# Patient Record
Sex: MALE | Race: WHITE | URBAN - METROPOLITAN AREA
[De-identification: names, ages, dates, MRNs, and addresses within clinical notes are randomized per-mention and may not be internally consistent; named-entity substitution may affect disease eponyms.]

---

## 2022-01-11 PROBLEM — F51.01 PRIMARY INSOMNIA: Status: ACTIVE | Noted: 2022-01-11

## 2022-01-11 PROBLEM — E03.9 HYPOTHYROIDISM: Status: ACTIVE | Noted: 2022-01-11

## 2023-02-13 ENCOUNTER — APPOINTMENT (OUTPATIENT)
Dept: RADIOLOGY | Facility: MEDICAL CENTER | Age: 63
DRG: 481 | End: 2023-02-13
Payer: OTHER MISCELLANEOUS

## 2023-02-13 ENCOUNTER — HOSPITAL ENCOUNTER (OUTPATIENT)
Dept: RADIOLOGY | Facility: MEDICAL CENTER | Age: 63
End: 2023-02-13

## 2023-02-13 ENCOUNTER — HOSPITAL ENCOUNTER (INPATIENT)
Facility: MEDICAL CENTER | Age: 63
LOS: 3 days | DRG: 481 | End: 2023-02-16
Attending: EMERGENCY MEDICINE | Admitting: INTERNAL MEDICINE
Payer: OTHER MISCELLANEOUS

## 2023-02-13 DIAGNOSIS — S72.432A CLOSED BICONDYLAR FRACTURE OF LEFT FEMUR, INITIAL ENCOUNTER (HCC): ICD-10-CM

## 2023-02-13 DIAGNOSIS — S72.422A CLOSED BICONDYLAR FRACTURE OF LEFT FEMUR, INITIAL ENCOUNTER (HCC): ICD-10-CM
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
## 2023-02-13 DIAGNOSIS — S72.22XA CLOSED LEFT SUBTROCHANTERIC FEMUR FRACTURE, INITIAL ENCOUNTER (HCC): ICD-10-CM

## 2023-02-13 DIAGNOSIS — G89.18 POST-OPERATIVE PAIN: ICD-10-CM

## 2023-02-13 PROBLEM — D69.6 THROMBOCYTOPENIA (HCC): Status: ACTIVE | Noted: 2023-02-13

## 2023-02-13 PROCEDURE — 770001 HCHG ROOM/CARE - MED/SURG/GYN PRIV*

## 2023-02-13 PROCEDURE — 99223 1ST HOSP IP/OBS HIGH 75: CPT | Performed by: INTERNAL MEDICINE

## 2023-02-13 PROCEDURE — A9270 NON-COVERED ITEM OR SERVICE: HCPCS | Performed by: INTERNAL MEDICINE

## 2023-02-13 PROCEDURE — 36415 COLL VENOUS BLD VENIPUNCTURE: CPT

## 2023-02-13 PROCEDURE — 305948 HCHG GREEN TRAUMA ACT PRE-NOTIFY NO CC

## 2023-02-13 PROCEDURE — 99285 EMERGENCY DEPT VISIT HI MDM: CPT

## 2023-02-13 PROCEDURE — 73552 X-RAY EXAM OF FEMUR 2/>: CPT | Mod: LT

## 2023-02-13 PROCEDURE — 700102 HCHG RX REV CODE 250 W/ 637 OVERRIDE(OP): Performed by: INTERNAL MEDICINE

## 2023-02-13 RX ORDER — DOCUSATE SODIUM 100 MG/1
300 CAPSULE, LIQUID FILLED ORAL
COMMUNITY

## 2023-02-13 RX ORDER — OXYCODONE HYDROCHLORIDE 5 MG/1
5 TABLET ORAL
Status: DISCONTINUED | OUTPATIENT
Start: 2023-02-13 | End: 2023-02-16 | Stop reason: HOSPADM

## 2023-02-13 RX ORDER — AMOXICILLIN 250 MG
2 CAPSULE ORAL 2 TIMES DAILY
Status: DISCONTINUED | OUTPATIENT
Start: 2023-02-13 | End: 2023-02-14

## 2023-02-13 RX ORDER — ONDANSETRON 4 MG/1
4 TABLET, ORALLY DISINTEGRATING ORAL EVERY 4 HOURS PRN
Status: DISCONTINUED | OUTPATIENT
Start: 2023-02-13 | End: 2023-02-16 | Stop reason: HOSPADM

## 2023-02-13 RX ORDER — ACETAMINOPHEN 325 MG/1
650 TABLET ORAL EVERY 6 HOURS PRN
Status: DISCONTINUED | OUTPATIENT
Start: 2023-02-13 | End: 2023-02-16 | Stop reason: HOSPADM

## 2023-02-13 RX ORDER — HYDROMORPHONE HYDROCHLORIDE 1 MG/ML
0.25 INJECTION, SOLUTION INTRAMUSCULAR; INTRAVENOUS; SUBCUTANEOUS
Status: DISCONTINUED | OUTPATIENT
Start: 2023-02-13 | End: 2023-02-16 | Stop reason: HOSPADM

## 2023-02-13 RX ORDER — BISACODYL 10 MG
10 SUPPOSITORY, RECTAL RECTAL
Status: DISCONTINUED | OUTPATIENT
Start: 2023-02-13 | End: 2023-02-16 | Stop reason: HOSPADM

## 2023-02-13 RX ORDER — PROMETHAZINE HYDROCHLORIDE 25 MG/1
12.5-25 TABLET ORAL EVERY 4 HOURS PRN
Status: DISCONTINUED | OUTPATIENT
Start: 2023-02-13 | End: 2023-02-16 | Stop reason: HOSPADM

## 2023-02-13 RX ORDER — ONDANSETRON 2 MG/ML
4 INJECTION INTRAMUSCULAR; INTRAVENOUS EVERY 4 HOURS PRN
Status: DISCONTINUED | OUTPATIENT
Start: 2023-02-13 | End: 2023-02-16 | Stop reason: HOSPADM

## 2023-02-13 RX ORDER — OXYCODONE HYDROCHLORIDE 5 MG/1
2.5 TABLET ORAL
Status: DISCONTINUED | OUTPATIENT
Start: 2023-02-13 | End: 2023-02-16 | Stop reason: HOSPADM

## 2023-02-13 RX ORDER — PROCHLORPERAZINE EDISYLATE 5 MG/ML
5-10 INJECTION INTRAMUSCULAR; INTRAVENOUS EVERY 4 HOURS PRN
Status: DISCONTINUED | OUTPATIENT
Start: 2023-02-13 | End: 2023-02-16 | Stop reason: HOSPADM

## 2023-02-13 RX ORDER — PROMETHAZINE HYDROCHLORIDE 25 MG/1
12.5-25 SUPPOSITORY RECTAL EVERY 4 HOURS PRN
Status: DISCONTINUED | OUTPATIENT
Start: 2023-02-13 | End: 2023-02-16 | Stop reason: HOSPADM

## 2023-02-13 RX ORDER — THYROID 120 MG/1
240 TABLET ORAL DAILY
Status: DISCONTINUED | OUTPATIENT
Start: 2023-02-14 | End: 2023-02-16 | Stop reason: HOSPADM

## 2023-02-13 RX ORDER — POLYETHYLENE GLYCOL 3350 17 G/17G
1 POWDER, FOR SOLUTION ORAL
Status: DISCONTINUED | OUTPATIENT
Start: 2023-02-13 | End: 2023-02-16 | Stop reason: HOSPADM

## 2023-02-13 RX ORDER — GABAPENTIN 300 MG/1
600 CAPSULE ORAL
Status: DISCONTINUED | OUTPATIENT
Start: 2023-02-13 | End: 2023-02-16 | Stop reason: HOSPADM

## 2023-02-13 RX ADMIN — ACETAMINOPHEN 650 MG: 325 TABLET, FILM COATED ORAL at 23:45

## 2023-02-13 ASSESSMENT — ENCOUNTER SYMPTOMS
NAUSEA: 0
COUGH: 0
ABDOMINAL PAIN: 0
FEVER: 0
NERVOUS/ANXIOUS: 0
SHORTNESS OF BREATH: 0
LOSS OF CONSCIOUSNESS: 0
HEADACHES: 0
SORE THROAT: 0
DOUBLE VISION: 0
DIARRHEA: 0
VOMITING: 0
FALLS: 1
SEIZURES: 0
PALPITATIONS: 0
HALLUCINATIONS: 0
CHILLS: 0
BLURRED VISION: 0
CONSTIPATION: 0

## 2023-02-13 ASSESSMENT — COGNITIVE AND FUNCTIONAL STATUS - GENERAL
MOVING FROM LYING ON BACK TO SITTING ON SIDE OF FLAT BED: A LITTLE
SUGGESTED CMS G CODE MODIFIER DAILY ACTIVITY: CJ
MOBILITY SCORE: 16
CLIMB 3 TO 5 STEPS WITH RAILING: A LOT
STANDING UP FROM CHAIR USING ARMS: A LOT
SUGGESTED CMS G CODE MODIFIER MOBILITY: CK
WALKING IN HOSPITAL ROOM: TOTAL
DAILY ACTIVITIY SCORE: 22
DRESSING REGULAR LOWER BODY CLOTHING: A LITTLE
HELP NEEDED FOR BATHING: A LITTLE

## 2023-02-13 ASSESSMENT — LIFESTYLE VARIABLES
TOTAL SCORE: 0
HAVE PEOPLE ANNOYED YOU BY CRITICIZING YOUR DRINKING: NO
HOW MANY TIMES IN THE PAST YEAR HAVE YOU HAD 5 OR MORE DRINKS IN A DAY: 0
HAVE YOU EVER FELT YOU SHOULD CUT DOWN ON YOUR DRINKING: NO
ON A TYPICAL DAY WHEN YOU DRINK ALCOHOL HOW MANY DRINKS DO YOU HAVE: 4
AVERAGE NUMBER OF DAYS PER WEEK YOU HAVE A DRINK CONTAINING ALCOHOL: 7
ALCOHOL_USE: YES
EVER FELT BAD OR GUILTY ABOUT YOUR DRINKING: NO
TOTAL SCORE: 0
EVER HAD A DRINK FIRST THING IN THE MORNING TO STEADY YOUR NERVES TO GET RID OF A HANGOVER: NO
TOTAL SCORE: 0
CONSUMPTION TOTAL: POSITIVE

## 2023-02-13 ASSESSMENT — PATIENT HEALTH QUESTIONNAIRE - PHQ9
1. LITTLE INTEREST OR PLEASURE IN DOING THINGS: NOT AT ALL
SUM OF ALL RESPONSES TO PHQ9 QUESTIONS 1 AND 2: 0
2. FEELING DOWN, DEPRESSED, IRRITABLE, OR HOPELESS: NOT AT ALL

## 2023-02-13 ASSESSMENT — FIBROSIS 4 INDEX
FIB4 SCORE: 1.961161351381840319
FIB4 SCORE: 1.961161351381840319

## 2023-02-13 ASSESSMENT — PAIN DESCRIPTION - PAIN TYPE: TYPE: ACUTE PAIN

## 2023-02-14 ENCOUNTER — APPOINTMENT (OUTPATIENT)
Dept: RADIOLOGY | Facility: MEDICAL CENTER | Age: 63
DRG: 481 | End: 2023-02-14
Attending: STUDENT IN AN ORGANIZED HEALTH CARE EDUCATION/TRAINING PROGRAM
Payer: OTHER MISCELLANEOUS

## 2023-02-14 ENCOUNTER — ANESTHESIA (OUTPATIENT)
Dept: SURGERY | Facility: MEDICAL CENTER | Age: 63
DRG: 481 | End: 2023-02-14
Payer: OTHER MISCELLANEOUS

## 2023-02-14 LAB
ABO + RH BLD: NORMAL
ABO GROUP BLD: NORMAL
ALBUMIN SERPL BCP-MCNC: 3.8 G/DL (ref 3.2–4.9)
BASOPHILS # BLD AUTO: 0.1 % (ref 0–1.8)
BASOPHILS # BLD: 0.01 K/UL (ref 0–0.12)
BLD GP AB SCN SERPL QL: NORMAL
BUN SERPL-MCNC: 18 MG/DL (ref 8–22)
CALCIUM ALBUM COR SERPL-MCNC: 8.6 MG/DL (ref 8.5–10.5)
CALCIUM SERPL-MCNC: 8.4 MG/DL (ref 8.5–10.5)
CHLORIDE SERPL-SCNC: 104 MMOL/L (ref 96–112)
CO2 SERPL-SCNC: 23 MMOL/L (ref 20–33)
CREAT SERPL-MCNC: 0.66 MG/DL (ref 0.5–1.4)
EKG IMPRESSION: NORMAL
EOSINOPHIL # BLD AUTO: 0.01 K/UL (ref 0–0.51)
EOSINOPHIL NFR BLD: 0.1 % (ref 0–6.9)
ERYTHROCYTE [DISTWIDTH] IN BLOOD BY AUTOMATED COUNT: 41.4 FL (ref 35.9–50)
GFR SERPLBLD CREATININE-BSD FMLA CKD-EPI: 104 ML/MIN/1.73 M 2
GLUCOSE SERPL-MCNC: 153 MG/DL (ref 65–99)
HCT VFR BLD AUTO: 37.6 % (ref 42–52)
HGB BLD-MCNC: 13.3 G/DL (ref 14–18)
IMM GRANULOCYTES # BLD AUTO: 0.05 K/UL (ref 0–0.11)
IMM GRANULOCYTES NFR BLD AUTO: 0.5 % (ref 0–0.9)
LYMPHOCYTES # BLD AUTO: 0.81 K/UL (ref 1–4.8)
LYMPHOCYTES NFR BLD: 8.8 % (ref 22–41)
MAGNESIUM SERPL-MCNC: 1.8 MG/DL (ref 1.5–2.5)
MCH RBC QN AUTO: 31.1 PG (ref 27–33)
MCHC RBC AUTO-ENTMCNC: 35.4 G/DL (ref 33.7–35.3)
MCV RBC AUTO: 87.9 FL (ref 81.4–97.8)
MONOCYTES # BLD AUTO: 1.03 K/UL (ref 0–0.85)
MONOCYTES NFR BLD AUTO: 11.2 % (ref 0–13.4)
NEUTROPHILS # BLD AUTO: 7.25 K/UL (ref 1.82–7.42)
NEUTROPHILS NFR BLD: 79.3 % (ref 44–72)
NRBC # BLD AUTO: 0 K/UL
NRBC BLD-RTO: 0 /100 WBC
PHOSPHATE SERPL-MCNC: 3.9 MG/DL (ref 2.5–4.5)
PLATELET # BLD AUTO: 137 K/UL (ref 164–446)
PMV BLD AUTO: 11.1 FL (ref 9–12.9)
POTASSIUM SERPL-SCNC: 3.9 MMOL/L (ref 3.6–5.5)
RBC # BLD AUTO: 4.28 M/UL (ref 4.7–6.1)
RH BLD: NORMAL
SODIUM SERPL-SCNC: 137 MMOL/L (ref 135–145)
WBC # BLD AUTO: 9.2 K/UL (ref 4.8–10.8)

## 2023-02-14 PROCEDURE — C1713 ANCHOR/SCREW BN/BN,TIS/BN: HCPCS | Performed by: STUDENT IN AN ORGANIZED HEALTH CARE EDUCATION/TRAINING PROGRAM

## 2023-02-14 PROCEDURE — 85025 COMPLETE CBC W/AUTO DIFF WBC: CPT

## 2023-02-14 PROCEDURE — 700111 HCHG RX REV CODE 636 W/ 250 OVERRIDE (IP): Performed by: ANESTHESIOLOGY

## 2023-02-14 PROCEDURE — 01230 ANES OPN UPPER 2/3 FEMUR NOS: CPT | Performed by: ANESTHESIOLOGY

## 2023-02-14 PROCEDURE — 86850 RBC ANTIBODY SCREEN: CPT

## 2023-02-14 PROCEDURE — 93005 ELECTROCARDIOGRAM TRACING: CPT | Performed by: STUDENT IN AN ORGANIZED HEALTH CARE EDUCATION/TRAINING PROGRAM

## 2023-02-14 PROCEDURE — 700102 HCHG RX REV CODE 250 W/ 637 OVERRIDE(OP): Performed by: NURSE PRACTITIONER

## 2023-02-14 PROCEDURE — 99233 SBSQ HOSP IP/OBS HIGH 50: CPT | Performed by: STUDENT IN AN ORGANIZED HEALTH CARE EDUCATION/TRAINING PROGRAM

## 2023-02-14 PROCEDURE — 502240 HCHG MISC OR SUPPLY RC 0272: Performed by: STUDENT IN AN ORGANIZED HEALTH CARE EDUCATION/TRAINING PROGRAM

## 2023-02-14 PROCEDURE — 160002 HCHG RECOVERY MINUTES (STAT): Performed by: STUDENT IN AN ORGANIZED HEALTH CARE EDUCATION/TRAINING PROGRAM

## 2023-02-14 PROCEDURE — 770001 HCHG ROOM/CARE - MED/SURG/GYN PRIV*

## 2023-02-14 PROCEDURE — 86900 BLOOD TYPING SEROLOGIC ABO: CPT

## 2023-02-14 PROCEDURE — 93010 ELECTROCARDIOGRAM REPORT: CPT | Performed by: INTERNAL MEDICINE

## 2023-02-14 PROCEDURE — 700102 HCHG RX REV CODE 250 W/ 637 OVERRIDE(OP): Performed by: INTERNAL MEDICINE

## 2023-02-14 PROCEDURE — 160036 HCHG PACU - EA ADDL 30 MINS PHASE I: Performed by: STUDENT IN AN ORGANIZED HEALTH CARE EDUCATION/TRAINING PROGRAM

## 2023-02-14 PROCEDURE — 160029 HCHG SURGERY MINUTES - 1ST 30 MINS LEVEL 4: Performed by: STUDENT IN AN ORGANIZED HEALTH CARE EDUCATION/TRAINING PROGRAM

## 2023-02-14 PROCEDURE — 700105 HCHG RX REV CODE 258: Performed by: ANESTHESIOLOGY

## 2023-02-14 PROCEDURE — 700101 HCHG RX REV CODE 250: Performed by: ANESTHESIOLOGY

## 2023-02-14 PROCEDURE — 80069 RENAL FUNCTION PANEL: CPT

## 2023-02-14 PROCEDURE — 160041 HCHG SURGERY MINUTES - EA ADDL 1 MIN LEVEL 4: Performed by: STUDENT IN AN ORGANIZED HEALTH CARE EDUCATION/TRAINING PROGRAM

## 2023-02-14 PROCEDURE — 27513 TREATMENT OF THIGH FRACTURE: CPT | Mod: LT | Performed by: STUDENT IN AN ORGANIZED HEALTH CARE EDUCATION/TRAINING PROGRAM

## 2023-02-14 PROCEDURE — 700105 HCHG RX REV CODE 258: Performed by: NURSE PRACTITIONER

## 2023-02-14 PROCEDURE — A9270 NON-COVERED ITEM OR SERVICE: HCPCS | Performed by: NURSE PRACTITIONER

## 2023-02-14 PROCEDURE — A9270 NON-COVERED ITEM OR SERVICE: HCPCS | Performed by: ANESTHESIOLOGY

## 2023-02-14 PROCEDURE — 110371 HCHG SHELL REV 272: Performed by: STUDENT IN AN ORGANIZED HEALTH CARE EDUCATION/TRAINING PROGRAM

## 2023-02-14 PROCEDURE — 99222 1ST HOSP IP/OBS MODERATE 55: CPT | Mod: 57 | Performed by: STUDENT IN AN ORGANIZED HEALTH CARE EDUCATION/TRAINING PROGRAM

## 2023-02-14 PROCEDURE — 160048 HCHG OR STATISTICAL LEVEL 1-5: Performed by: STUDENT IN AN ORGANIZED HEALTH CARE EDUCATION/TRAINING PROGRAM

## 2023-02-14 PROCEDURE — 83735 ASSAY OF MAGNESIUM: CPT

## 2023-02-14 PROCEDURE — 0QSC06Z REPOSITION LEFT LOWER FEMUR WITH INTRAMEDULLARY INTERNAL FIXATION DEVICE, OPEN APPROACH: ICD-10-PCS | Performed by: STUDENT IN AN ORGANIZED HEALTH CARE EDUCATION/TRAINING PROGRAM

## 2023-02-14 PROCEDURE — 502000 HCHG MISC OR IMPLANTS RC 0278: Performed by: STUDENT IN AN ORGANIZED HEALTH CARE EDUCATION/TRAINING PROGRAM

## 2023-02-14 PROCEDURE — 73552 X-RAY EXAM OF FEMUR 2/>: CPT | Mod: LT

## 2023-02-14 PROCEDURE — 27513 TREATMENT OF THIGH FRACTURE: CPT | Mod: 80ROC,LT | Performed by: STUDENT IN AN ORGANIZED HEALTH CARE EDUCATION/TRAINING PROGRAM

## 2023-02-14 PROCEDURE — A9270 NON-COVERED ITEM OR SERVICE: HCPCS | Performed by: INTERNAL MEDICINE

## 2023-02-14 PROCEDURE — 700102 HCHG RX REV CODE 250 W/ 637 OVERRIDE(OP): Performed by: ANESTHESIOLOGY

## 2023-02-14 PROCEDURE — 160035 HCHG PACU - 1ST 60 MINS PHASE I: Performed by: STUDENT IN AN ORGANIZED HEALTH CARE EDUCATION/TRAINING PROGRAM

## 2023-02-14 PROCEDURE — 160009 HCHG ANES TIME/MIN: Performed by: STUDENT IN AN ORGANIZED HEALTH CARE EDUCATION/TRAINING PROGRAM

## 2023-02-14 PROCEDURE — 36415 COLL VENOUS BLD VENIPUNCTURE: CPT

## 2023-02-14 PROCEDURE — 86901 BLOOD TYPING SEROLOGIC RH(D): CPT

## 2023-02-14 DEVICE — IMPLANTABLE DEVICE: Type: IMPLANTABLE DEVICE | Site: LEG | Status: FUNCTIONAL

## 2023-02-14 DEVICE — LOCKING SCREW DIA 5X85MM: Type: IMPLANTABLE DEVICE | Site: LEG | Status: FUNCTIONAL

## 2023-02-14 DEVICE — LOCKING SCREW DIA 5X37.5MM: Type: IMPLANTABLE DEVICE | Site: LEG | Status: FUNCTIONAL

## 2023-02-14 DEVICE — LOCKING SCREW DIA 5X75MM: Type: IMPLANTABLE DEVICE | Site: LEG | Status: FUNCTIONAL

## 2023-02-14 RX ORDER — SODIUM CHLORIDE, SODIUM LACTATE, POTASSIUM CHLORIDE, AND CALCIUM CHLORIDE .6; .31; .03; .02 G/100ML; G/100ML; G/100ML; G/100ML
1000 INJECTION, SOLUTION INTRAVENOUS ONCE
Status: COMPLETED | OUTPATIENT
Start: 2023-02-14 | End: 2023-02-14

## 2023-02-14 RX ORDER — ONDANSETRON 2 MG/ML
4 INJECTION INTRAMUSCULAR; INTRAVENOUS
Status: COMPLETED | OUTPATIENT
Start: 2023-02-14 | End: 2023-02-14

## 2023-02-14 RX ORDER — OXYCODONE HCL 5 MG/5 ML
10 SOLUTION, ORAL ORAL
Status: COMPLETED | OUTPATIENT
Start: 2023-02-14 | End: 2023-02-14

## 2023-02-14 RX ORDER — METOCLOPRAMIDE HYDROCHLORIDE 5 MG/ML
INJECTION INTRAMUSCULAR; INTRAVENOUS PRN
Status: DISCONTINUED | OUTPATIENT
Start: 2023-02-14 | End: 2023-02-14 | Stop reason: SURG

## 2023-02-14 RX ORDER — DIPHENHYDRAMINE HYDROCHLORIDE 50 MG/ML
12.5 INJECTION INTRAMUSCULAR; INTRAVENOUS
Status: DISCONTINUED | OUTPATIENT
Start: 2023-02-14 | End: 2023-02-14 | Stop reason: HOSPADM

## 2023-02-14 RX ORDER — MEPERIDINE HYDROCHLORIDE 25 MG/ML
12.5 INJECTION INTRAMUSCULAR; INTRAVENOUS; SUBCUTANEOUS
Status: DISCONTINUED | OUTPATIENT
Start: 2023-02-14 | End: 2023-02-14 | Stop reason: HOSPADM

## 2023-02-14 RX ORDER — CEFAZOLIN SODIUM 1 G/3ML
INJECTION, POWDER, FOR SOLUTION INTRAMUSCULAR; INTRAVENOUS PRN
Status: DISCONTINUED | OUTPATIENT
Start: 2023-02-14 | End: 2023-02-14 | Stop reason: SURG

## 2023-02-14 RX ORDER — DOCUSATE SODIUM 100 MG/1
100 CAPSULE, LIQUID FILLED ORAL NIGHTLY
Status: DISCONTINUED | OUTPATIENT
Start: 2023-02-14 | End: 2023-02-14

## 2023-02-14 RX ORDER — SODIUM CHLORIDE, SODIUM LACTATE, POTASSIUM CHLORIDE, CALCIUM CHLORIDE 600; 310; 30; 20 MG/100ML; MG/100ML; MG/100ML; MG/100ML
INJECTION, SOLUTION INTRAVENOUS
Status: DISCONTINUED | OUTPATIENT
Start: 2023-02-14 | End: 2023-02-14 | Stop reason: SURG

## 2023-02-14 RX ORDER — HALOPERIDOL 5 MG/ML
1 INJECTION INTRAMUSCULAR
Status: DISCONTINUED | OUTPATIENT
Start: 2023-02-14 | End: 2023-02-14 | Stop reason: HOSPADM

## 2023-02-14 RX ORDER — HYDROMORPHONE HYDROCHLORIDE 1 MG/ML
0.2 INJECTION, SOLUTION INTRAMUSCULAR; INTRAVENOUS; SUBCUTANEOUS
Status: DISCONTINUED | OUTPATIENT
Start: 2023-02-14 | End: 2023-02-14 | Stop reason: HOSPADM

## 2023-02-14 RX ORDER — HYDROMORPHONE HYDROCHLORIDE 1 MG/ML
0.1 INJECTION, SOLUTION INTRAMUSCULAR; INTRAVENOUS; SUBCUTANEOUS
Status: DISCONTINUED | OUTPATIENT
Start: 2023-02-14 | End: 2023-02-14 | Stop reason: HOSPADM

## 2023-02-14 RX ORDER — DEXAMETHASONE SODIUM PHOSPHATE 4 MG/ML
INJECTION, SOLUTION INTRA-ARTICULAR; INTRALESIONAL; INTRAMUSCULAR; INTRAVENOUS; SOFT TISSUE PRN
Status: DISCONTINUED | OUTPATIENT
Start: 2023-02-14 | End: 2023-02-14 | Stop reason: SURG

## 2023-02-14 RX ORDER — OXYCODONE HCL 5 MG/5 ML
5 SOLUTION, ORAL ORAL
Status: COMPLETED | OUTPATIENT
Start: 2023-02-14 | End: 2023-02-14

## 2023-02-14 RX ORDER — HYDROMORPHONE HYDROCHLORIDE 1 MG/ML
0.4 INJECTION, SOLUTION INTRAMUSCULAR; INTRAVENOUS; SUBCUTANEOUS
Status: DISCONTINUED | OUTPATIENT
Start: 2023-02-14 | End: 2023-02-14 | Stop reason: HOSPADM

## 2023-02-14 RX ORDER — HYDROMORPHONE HYDROCHLORIDE 2 MG/ML
INJECTION, SOLUTION INTRAMUSCULAR; INTRAVENOUS; SUBCUTANEOUS PRN
Status: DISCONTINUED | OUTPATIENT
Start: 2023-02-14 | End: 2023-02-14 | Stop reason: SURG

## 2023-02-14 RX ORDER — DOCUSATE SODIUM 100 MG/1
300 CAPSULE, LIQUID FILLED ORAL NIGHTLY
Status: DISCONTINUED | OUTPATIENT
Start: 2023-02-15 | End: 2023-02-14

## 2023-02-14 RX ORDER — DOCUSATE SODIUM 100 MG/1
300 CAPSULE, LIQUID FILLED ORAL NIGHTLY
Status: DISCONTINUED | OUTPATIENT
Start: 2023-02-14 | End: 2023-02-16 | Stop reason: HOSPADM

## 2023-02-14 RX ADMIN — DOCUSATE SODIUM 300 MG: 100 CAPSULE, LIQUID FILLED ORAL at 20:20

## 2023-02-14 RX ADMIN — OXYCODONE HYDROCHLORIDE 5 MG: 5 SOLUTION ORAL at 14:36

## 2023-02-14 RX ADMIN — GABAPENTIN 600 MG: 300 CAPSULE ORAL at 00:00

## 2023-02-14 RX ADMIN — SODIUM CHLORIDE, POTASSIUM CHLORIDE, SODIUM LACTATE AND CALCIUM CHLORIDE: 600; 310; 30; 20 INJECTION, SOLUTION INTRAVENOUS at 11:49

## 2023-02-14 RX ADMIN — MEPERIDINE HYDROCHLORIDE 12.5 MG: 25 INJECTION INTRAMUSCULAR; INTRAVENOUS; SUBCUTANEOUS at 13:58

## 2023-02-14 RX ADMIN — HYDROMORPHONE HYDROCHLORIDE 2 MG: 2 INJECTION INTRAMUSCULAR; INTRAVENOUS; SUBCUTANEOUS at 12:53

## 2023-02-14 RX ADMIN — THYROID, PORCINE 240 MG: 120 TABLET ORAL at 05:13

## 2023-02-14 RX ADMIN — FENTANYL CITRATE 100 MCG: 50 INJECTION, SOLUTION INTRAMUSCULAR; INTRAVENOUS at 12:17

## 2023-02-14 RX ADMIN — ROCURONIUM BROMIDE 50 MG: 10 INJECTION, SOLUTION INTRAVENOUS at 11:56

## 2023-02-14 RX ADMIN — SODIUM CHLORIDE, POTASSIUM CHLORIDE, SODIUM LACTATE AND CALCIUM CHLORIDE 1000 ML: 600; 310; 30; 20 INJECTION, SOLUTION INTRAVENOUS at 20:20

## 2023-02-14 RX ADMIN — PROPOFOL 150 MG: 10 INJECTION, EMULSION INTRAVENOUS at 11:56

## 2023-02-14 RX ADMIN — OXYCODONE 5 MG: 5 TABLET ORAL at 00:01

## 2023-02-14 RX ADMIN — OXYCODONE 5 MG: 5 TABLET ORAL at 10:19

## 2023-02-14 RX ADMIN — ONDANSETRON 4 MG: 2 INJECTION INTRAMUSCULAR; INTRAVENOUS at 15:15

## 2023-02-14 RX ADMIN — SENNOSIDES AND DOCUSATE SODIUM 2 TABLET: 50; 8.6 TABLET ORAL at 17:57

## 2023-02-14 RX ADMIN — DEXAMETHASONE SODIUM PHOSPHATE 4 MG: 4 INJECTION, SOLUTION INTRA-ARTICULAR; INTRALESIONAL; INTRAMUSCULAR; INTRAVENOUS; SOFT TISSUE at 11:56

## 2023-02-14 RX ADMIN — GABAPENTIN 600 MG: 300 CAPSULE ORAL at 20:20

## 2023-02-14 RX ADMIN — OXYCODONE 5 MG: 5 TABLET ORAL at 21:57

## 2023-02-14 RX ADMIN — METOCLOPRAMIDE 10 MG: 5 INJECTION, SOLUTION INTRAMUSCULAR; INTRAVENOUS at 11:56

## 2023-02-14 RX ADMIN — CEFAZOLIN 2 G: 330 INJECTION, POWDER, FOR SOLUTION INTRAMUSCULAR; INTRAVENOUS at 11:58

## 2023-02-14 ASSESSMENT — ENCOUNTER SYMPTOMS
MYALGIAS: 0
SHORTNESS OF BREATH: 0
NERVOUS/ANXIOUS: 0
SENSORY CHANGE: 0
INSOMNIA: 1
BLURRED VISION: 0
SPEECH CHANGE: 0
FEVER: 0
NAUSEA: 0
FOCAL WEAKNESS: 0
VOMITING: 0
ABDOMINAL PAIN: 0

## 2023-02-14 ASSESSMENT — LIFESTYLE VARIABLES: SUBSTANCE_ABUSE: 0

## 2023-02-14 ASSESSMENT — PAIN DESCRIPTION - PAIN TYPE: TYPE: ACUTE PAIN;SURGICAL PAIN

## 2023-02-14 ASSESSMENT — PAIN SCALES - GENERAL: PAIN_LEVEL: 3

## 2023-02-14 NOTE — ANESTHESIA POSTPROCEDURE EVALUATION
Patient: Christian Chilel    Procedure Summary     Date: 02/14/23 Room / Location: [place] [id number] / SURGERY [facility]    Anesthesia Start: 1149 Anesthesia Stop: 1355    Procedure: INSERTION, INTRAMEDULLARY VEDA, FEMUR RETROGRADE (Left: Leg Upper) Diagnosis: ( left comminuted distal femur fracture )    Surgeons: Rick Gamino M.D. Responsible Provider: Rufino Marshall M.D.    Anesthesia Type: general ASA Status: 3          Final Anesthesia Type: general  Last vitals  BP   Blood Pressure: 121/76    Temp   37.4 °C (99.4 °F)    Pulse   99   Resp   16    SpO2   100 %      Anesthesia Post Evaluation    Patient location during evaluation: PACU  Patient participation: complete - patient participated  Level of consciousness: awake and alert  Pain score: 3    Airway patency: patent  Anesthetic complications: no  Cardiovascular status: hemodynamically stable  Respiratory status: acceptable  Hydration status: euvolemic    PONV: none          No notable events documented.     Nurse Pain Score: 2 (NPRS)

## 2023-02-14 NOTE — OP REPORT
DATE OF OPERATION: 2/14/2023     PREOPERATIVE DIAGNOSIS: Left distal femur intra-articular fracture    POSTOPERATIVE DIAGNOSIS: Same    PROCEDURE PERFORMED: Left femur open reduction internal fixation with retrograde intramedullary nail    SURGEON: Rick Gamino M.D.     ASSISTANT: Randy Cameron MD, fellow    ANESTHESIA: General    SPECIMEN: None    ESTIMATED BLOOD LOSS: 30 mL    IMPLANTS: [place] retrograde nail 13 x 380 mm, [place] mini frag plate 2.7 mm, [place] 5.0 mm headless compression screws x2      INDICATIONS: The patient is a 62 y.o. male who presented with above.  I discussed the risks and benefits of the procedure which include but are not limited to risks of infection, wound healing complication, neurovascular injury, pain, malunion, non-union, malrotation, and the medical risks of anesthesia including MI, stroke, and death.  Alternatives to surgery were also discussed, including non-operative management, which I did not recommend.  The patient was in agreement with the plan to proceed, and the informed consent was signed and documented.  I met with the patient pre-operatively and marked the operative extremity with their agreement.  We proceeded to the operating room.     DESCRIPTION OF PROCEDURE:  Patient was seen in the preoperative holding area on the day of surgery. The operative site was marked with my initials.  he was taken to the operating room and placed supine on the operative table.  Anesthesia was induced.  The operative extremity was prepped and draped in the normal sterile fashion.  Operative pause was conducted and the correct patient, site, side, procedure, and surgeon's initials on extremity were identified.  A extended lateral parapatellar approach was performed at the knee joint.  There was obvious intra-articular split.  This was cleaned of any hematoma and reduced anatomically.  This was held provisionally with pointed reduction clamp.  The guidewires for the 5.0 mm  headless compression screws were then placed on AP and lateral views.  Keeping in mind to interfere with our eventual nail trajectory.  Next the lateral fracture plane was reduced reducing articular block to the shaft.  This was held provisionally with a 2.7 mm small frag plate with unicortical locking screws.  This aligned our length alignment rotation.  There is a medial butterfly fragment that was reduced with percutaneous pointed reduction clamp.  We then placed our guidewire for the intramedullary nail.  Anterior was then utilized again intracanal.  We underwent sequential reaming up to 14 mm reamer.  We then placed the appropriate length 30 mm nail.  Once this was melena placed we placed multiple distal interlock screws through the jig.  We then placed a single proximal interlock screws using perfect [place] technique.  Final images were obtained taking appropriate reduction of length alignment rotation.  The wound was thoroughly irrigated sterile saline closed in layered fashion.  Sterile dressings were applied.  Patient was woken taken to PACU stable condition.    POSTOPERATIVE PLAN: Toe-touch weightbearing left lower extremity, knee range of motion as tolerated.  Weight bearing.  Mobilize with physical and occupational therapies.  DVT prophylaxis with SCDs and Lovenox until mobilizing independently and then can be switched to aspirin for 4 weeks.  The patient will follow up in clinic in 2 weeks to check wounds and remove sutures/staples.      ____________________________________   Rick Gamino M.D.   DD: 2/14/2023  1:31 PM

## 2023-02-14 NOTE — THERAPY
Patient Name: Christian Chilel  Age:  62 y.o., Sex:  male  Medical Record #: [id number]  Today's Date: 2/14/2023     Pt to OR today for Femur fixation, will hold and follow up for PT evaluation post-operatively.    DENI FregosoT

## 2023-02-14 NOTE — H&P
Hospital Medicine History & Physical Note    Date of Service  2/13/2023    Primary Care Physician  Les Zamorano P.A.-C.    Consultants  orthopedics    Code Status  Full Code    Chief Complaint  Chief Complaint   Patient presents with    Trauma Green     Pt arrives as a trauma green transfer from [facility] [place]. Pt sustained a GLF today and fractured his left femur. Left leg currently in a knee immobilizer post reduction. GCS 15. -loc, - thinners        History of Presenting Illness  Christian Chilel is a 62 y.o. male who presented 2/13/2023 with fall.  History is significant for hypothyroidism, insomnia.  Patient presents today after sustaining a mechanical, ground-level fall.  Denies loss of dizziness or trauma to head.  She denies nausea, vomiting, fever, chills, chest pain, shortness of breath.  Presentation to the ED imaging of the left leg demonstrated a left femoral fracture.  Laboratory evaluation was largely unremarkable.  [facility] surgery was consulted and plan for OR in the morning.  Discussed case with ER provider and patient will be admitted for further work-up and monitoring.    I discussed the plan of care with patient and ER provider .    Review of Systems  Review of Systems   Constitutional:  Negative for chills and fever.   HENT:  Negative for congestion and sore throat.    Eyes:  Negative for blurred vision and double vision.   Respiratory:  Negative for cough and shortness of breath.    Cardiovascular:  Negative for chest pain and palpitations.   Gastrointestinal:  Negative for abdominal pain, constipation, diarrhea, nausea and vomiting.   Genitourinary:  Negative for dysuria and frequency.   Musculoskeletal:  Positive for falls and joint pain.   Skin:  Negative for rash.   Neurological:  Negative for seizures, loss of consciousness and headaches.   Psychiatric/Behavioral:  Negative for hallucinations. The patient is not nervous/anxious.      Past Medical History   has a past medical history of History of  chickenpox and Insomnia.    Surgical History   has a past surgical history that includes hernia repair and tonsillectomy.     Family History  family history includes Cancer in his father.   Family history reviewed with patient. There is no family history that is pertinent to the chief complaint.     Social History   reports that he has never smoked. He has never used smokeless tobacco. He reports current alcohol use. He reports that he does not use drugs.    Allergies  No Known Allergies    Medications  Prior to Admission Medications   Prescriptions Last Dose Informant Patient Reported? Taking?   CHROMIUM GTF PO   Yes No   Sig: Take 1,000 Units by mouth.   Cholecalciferol (VITAMIN D) 125 MCG (5000 UT) Cap   Yes No   Sig: Take 10,000 Units by mouth.   DHEA 25 MG Tab   Yes No   Sig: Take 25 mg by mouth every day.   Magnesium 100 MG Cap   Yes No   Sig: Take  by mouth.   Melatonin-Pyridoxine (MELATIN PO)   Yes No   Sig: Take 40 mg by mouth.   NON SPECIFIED   Yes No   Si mg. pqq   Pregnenolone Micronized (PREGNENOLONE PO)   Yes No   Sig: Take 100 mg by mouth.   RESVERATROL PO   Yes No   Sig: Take  by mouth.   Selenium (SELENIMIN PO)   Yes No   Sig: Take 400 mg by mouth.   VANADYL SULFATE PO   Yes No   Sig: Take 10 mg by mouth.   VITAMIN A PO   Yes No   Sig: Take 10,000 Units by mouth.   ZINC CITRATE PO   Yes No   Sig: Take 100 mg by mouth.   Zeaxanthin Powder   Yes No   Si mg.   gabapentin (NEURONTIN) 300 MG Cap   No No   Sig: Take 2 capsules at bedtime   sildenafil citrate (VIAGRA) 100 MG tablet   Yes No   Sig: Take 100 mg by mouth as needed for Erectile Dysfunction.   thyroid (ARMOUR THYROID) 240 MG tablet   No No   Sig: Take 1 Tablet by mouth every day.   vitamin e (VITAMIN E) 400 UNIT Cap   Yes No   Sig: Take 400 Units by mouth every day.      Facility-Administered Medications: None       Physical Exam  Temp:  [36.1 °C (97 °F)-36.4 °C (97.5 °F)] 36.4 °C (97.5 °F)  Pulse:  [] 123  Resp:  [12-18] 16  BP:  (116-163)/() 116/84  SpO2:  [93 %-100 %] 96 %  Blood Pressure: 116/84   Temperature: 36.4 °C (97.5 °F)   Pulse: (!) 123   Respiration: 16   Pulse Oximetry: 96 %       Physical Exam  Vitals and nursing note reviewed.   Constitutional:       General: He is not in acute distress.     Appearance: He is not toxic-appearing.   HENT:      Right Ear: External ear normal.      Left Ear: External ear normal.      Nose: No congestion.      Mouth/Throat:      Pharynx: No oropharyngeal exudate.   Eyes:      General: No scleral icterus.        Right eye: No discharge.      Pupils: Pupils are equal, round, and reactive to light.   Cardiovascular:      Rate and Rhythm: Regular rhythm. Tachycardia present.      Heart sounds: No murmur heard.  Pulmonary:      Breath sounds: No wheezing.   Abdominal:      Palpations: Abdomen is soft.      Tenderness: There is no abdominal tenderness. There is no guarding or rebound.   Musculoskeletal:      Comments: Left straight leg immobilizer  AROM limited by pain with internal rotation  Neurovascularly in tact   Skin:     Coloration: Skin is not jaundiced.      Findings: No bruising.   Neurological:      General: No focal deficit present.      Mental Status: He is alert and oriented to person, place, and time.      Cranial Nerves: No cranial nerve deficit.   Psychiatric:         Mood and Affect: Mood normal.         Behavior: Behavior normal.       Laboratory:  Recent Labs     02/13/23  1650   WBC 10.2   RBC 5.25   HEMOGLOBIN 16.2   HEMATOCRIT 46.1   MCV 87.8   MCH 30.9   MCHC 35.1   RDW 12.7   PLATELETCT 128*   MPV 11.0*     Recent Labs     02/13/23  1650   SODIUM 142   POTASSIUM 3.6   CHLORIDE 105   CO2 24   GLUCOSE 110*   BUN 16   CREATININE 0.8   CALCIUM 9.1     Recent Labs     02/13/23  1650   ALTSGPT 26   ASTSGOT 20   ALKPHOSPHAT 84   TBILIRUBIN 0.8   GLUCOSE 110*     Recent Labs     02/13/23  1650   APTT 22.5*   INR 0.94     No results for input(s): NTPROBNP in the last 72 hours.       No results for input(s): TROPONINT in the last 72 hours.    Imaging:  DX-FEMUR-2+ LEFT    (Results Pending)       X-Ray:  I have personally reviewed the images and compared with prior images.  EKG:  I have personally reviewed the images and compared with prior images.    Assessment/Plan:  Justification for Admission Status  I anticipate this patient will require at least two midnights for appropriate medical management, necessitating inpatient admission because femoral fracture requiring surgical intervention    Patient will need a  bed on EMERGENCY service .  The need is secondary to femoral fracture.    * Closed left subtrochanteric femur fracture, initial encounter (HCC)- (present on admission)  Assessment & Plan  Secondary to mechanical fall  As demonstrated on imaging  Surgery tomorrow  PT/OT for dispo  NPO after midnight    Thrombocytopenia (HCC)  Assessment & Plan  Plt 128  monitor    Primary insomnia- (present on admission)  Assessment & Plan  neurontin QHS    Hypothyroidism- (present on admission)  Assessment & Plan  armour thyroid        VTE prophylaxis: SCDs/TEDs no chemical prophylaxis in setting of planned surgical intervention

## 2023-02-14 NOTE — PROGRESS NOTES
4 Eyes Skin Assessment Completed by AMY bell and AMY miller.    Head Redness  Ears WDL  Nose Redness  Mouth WDL  Neck WDL  Breast/Chest WDL  Shoulder Blades WDL  Spine WDL  (R) Arm/Elbow/Hand WDL  (L) Arm/Elbow/Hand WDL  Abdomen WDL  Groin WDL  Scrotum/Coccyx/Buttocks - unable to visualize due to pain when turning from left leg/knee  (R) Leg WDL  (L) Leg Edema, redness, painful, edema  (R) Heel/Foot/Toe WDL  (L) Heel/Foot/Toe WDL       *Unable to complete sacral *    Devices In Places Leg Immobilizer      Interventions In Place Waffle Overlay    Possible Skin Injury No    Pictures Uploaded Into Epic N/A  Wound Consult Placed N/A  RN Wound Prevention Protocol Ordered No

## 2023-02-14 NOTE — CONSULTS
"2/14/2023    Time Called: 2300  Time Arrived: 1100      HPI: Christian Chilel is a 62 y.o. male who presents with left knee pain after ground-level fall.  Initial evaluation revealed a comminuted distal femur fracture with likely patellar dislocation.  Pain is fairly well controlled today.    Past Medical History:   Diagnosis Date    History of chickenpox     Insomnia        Past Surgical History:   Procedure Laterality Date    HERNIA REPAIR      TONSILLECTOMY         Medications  No current facility-administered medications on file prior to encounter.     Current Outpatient Medications on File Prior to Encounter   Medication Sig Dispense Refill    docusate sodium (COLACE) 100 MG Cap Take 300 mg by mouth at bedtime.      thyroid (ARMOUR THYROID) 240 MG tablet Take 1 Tablet by mouth every day. 90 Tablet 3    gabapentin (NEURONTIN) 300 MG Cap Take 2 capsules at bedtime 180 Capsule 2       Allergies  Food    ROS  . All other systems were reviewed and found to be negative    Family History   Problem Relation Age of Onset    Cancer Father        Social History     Socioeconomic History    Marital status: Unknown   Tobacco Use    Smoking status: Never    Smokeless tobacco: Never   Vaping Use    Vaping Use: Never used   Substance and Sexual Activity    Alcohol use: Yes    Drug use: Never    Sexual activity: Yes       Physical Exam  Vitals  /76   Pulse 99   Temp 37.4 °C (99.4 °F) (Temporal)   Resp 16   Ht 1.727 m (5' 8\")   Wt 70.4 kg (155 lb 3.3 oz)   SpO2 97%   General: Well Developed, Well Nourished, Age appropriate appearance  HEENT: Normocephalic, atraumatic  Psych: Normal mood and affect  Neck: Supple, nontender, no masses  Lungs: Breathing unlabored, No audible wheezing  Heart: Regular heart rate and rhythm  Abdomen: Soft, NT, ND  Neuro: Sensation grossly intact to BUE and BLE, moving all four extremities  Skin: Intact, no open wounds  Vascular: , Capillary refill <2 seconds  MSK: Left lower extremity: Mild " to moderate effusion noted left knee.  Sensation intact distally.      Radiographs:  OUTSIDE IMAGES-DX PELVIS   Final Result      OUTSIDE IMAGES-DX LOWER EXTREMITY, LEFT   Final Result      CT-FOREIGN FILM CAT SCAN   Final Result      DX-FEMUR-2+ LEFT   Final Result         1.  Comminuted distal femoral metadiaphyseal fracture which appears to extend into the knee joint space      DX-PORTABLE FLUORO > 1 HOUR    (Results Pending)   DX-FEMUR-2+ LEFT    (Results Pending)       Laboratory Values  Recent Labs     02/13/23 1650 02/14/23  0032   WBC 10.2 9.2   RBC 5.25 4.28*   HEMOGLOBIN 16.2 13.3*   HEMATOCRIT 46.1 37.6*   MCV 87.8 87.9   MCH 30.9 31.1   MCHC 35.1 35.4*   RDW 12.7 41.4   PLATELETCT 128* 137*   MPV 11.0* 11.1     Recent Labs     02/13/23 1650 02/14/23  0032   SODIUM 142 137   POTASSIUM 3.6 3.9   CHLORIDE 105 104   CO2 24 23   GLUCOSE 110* 153*   BUN 16 18     Recent Labs     02/13/23 1650   APTT 22.5*   INR 0.94         Impression: 62-year-old male ground-level fall with comminuted left distal femur fracture.  Plan for or today for open reduction internal fixation with intramedullary nail.    Plan:We discussed the diagnosis and findings with the patient at length.  We reviewed possible non operative and operative interventions and the risks and benefits of each of these.  he had a chance to ask questions and all of these were answered to his satisfaction. The patient chose to proceed with surgical intervention. Risks and benefits of surgery were discussed which include but are not limited to bleeding, infection, neurovascular damage, malunion, nonunion, instability, limb length discrepancy, DVT, PE, MI, Stroke and death. They understand these risks and wish to proceed.      Rick Gamino MD  Orthopedic Trauma Surgery

## 2023-02-14 NOTE — PROGRESS NOTES
Left distal femur fx  Plan for OR tomorrow for fixation  NPO at mn  Knee immobilizer for now      Rick Gamino MD  Orthopedic Trauma Surgery

## 2023-02-14 NOTE — ANESTHESIA PREPROCEDURE EVALUATION
Case: [id number] Date/Time: 02/14/23 1210    Procedure: INSERTION, INTRAMEDULLARY VEDA, FEMUR RETROGRADE (Left)    Location: [facility] OR  / SURGERY [facility]    Surgeons: Rick Gamino M.D.          Relevant Problems   ENDO   (positive) Hypothyroidism       Physical Exam    Airway   Mallampati: II  TM distance: >3 FB  Neck ROM: full       Cardiovascular - normal exam  Rhythm: regular  Rate: normal  (-) murmur     Dental - normal exam           Pulmonary - normal exam  Breath sounds clear to auscultation     Abdominal    Neurological - normal exam                 Anesthesia Plan    ASA 3       Plan - general               Induction: intravenous    Postoperative Plan: Postoperative administration of opioids is intended.    Pertinent diagnostic labs and testing reviewed    Informed Consent:    Anesthetic plan and risks discussed with patient.    Use of blood products discussed with: patient whom consented to blood products.

## 2023-02-14 NOTE — THERAPY
Occupational Therapy Contact Note    Patient Name: Christian Chilel  Age:  62 y.o., Sex:  male  Medical Record #: [id number]  Today's Date: 2/14/2023    Pt to OR today for Femur fixation, will hold and follow up for OT evaluation post-operatively.    Suraj Jay OTD, OTR/L

## 2023-02-14 NOTE — PROGRESS NOTES
Admitted from the ER, alert and able to let his needs known. Waffle and bed alarm placed. Left knee with abrasions, redness, pain and edema; in a knee immobilizer. NPO at midnight for surgery at 12:10p. Snacks and drinks provided.    Unable to complete sacral skin assessment due to pain when turning to visualize

## 2023-02-14 NOTE — OR NURSING
1349 Pt arrived to PACU with Anesthesiologist and OR RN. Pt drowsy post surgery. Even, unlabored respirations. VSS. Pt has no signs of pain. Pt has no signs of nausea. Left leg and left knee dressing CDI. Ice pack applied.     1509 Pt meets PACU Phase I criteria. Pt A&Ox4. Pt has mild pain and nausea.     1530 Pt denies pain and nausea.     1534 Report called to AMY Salazar.     1624 Pt transported to [facility] with transport. Chart and full oxygen tank with patient.

## 2023-02-14 NOTE — ED PROVIDER NOTES
"ED Provider Note    CHIEF COMPLAINT  Chief Complaint   Patient presents with    Trauma Green     Pt arrives as a trauma green transfer from [facility] [place]. Pt sustained a GLF today and fractured his left femur. Left leg currently in a knee immobilizer post reduction. GCS 15. -loc, - thinners        EXTERNAL RECORDS REVIEWED  External ED Note left distal femur fracture, conscious sedation for patella dislocation reduction    HPI/CATHY    Christian Chilel is a 62 y.o. male who presents via transfer for evaluation of a left distal femur fracture.  Ground-level fall slip on the ice.  Isolated injury.  History of insomnia, no anticoagulation.  Was evaluated by the orthopedic surgeon at the outside facility who recommended transfer to this facility given the extent of injury to the femur    PAST MEDICAL HISTORY   has a past medical history of History of chickenpox and Insomnia.    SURGICAL HISTORY   has a past surgical history that includes hernia repair and tonsillectomy.    FAMILY HISTORY  Family History   Problem Relation Age of Onset    Cancer Father        SOCIAL HISTORY  Social History     Tobacco Use    Smoking status: Never    Smokeless tobacco: Never   Vaping Use    Vaping Use: Never used   Substance and Sexual Activity    Alcohol use: Yes    Drug use: Never    Sexual activity: Yes       CURRENT MEDICATIONS  Home Medications    **Home medications have not yet been reviewed for this encounter**         ALLERGIES  No Known Allergies    PHYSICAL EXAM  VITAL SIGNS: /84   Pulse (!) 123   Temp 36.4 °C (97.5 °F) (Temporal)   Resp 16   Ht 1.727 m (5' 8\")   Wt 81.6 kg (180 lb)   SpO2 96%   BMI 27.37 kg/m²    Constitutional: An alert patient in no acute distress  HENT: Head is atraumatic.  Neck: Non tender, full range of motion  Thorax: No respiratory distress.  Lungs are clear to auscultation with symmetric air movement, regular cardiac rhythm  Abdomen: Soft, with no tenderness.  Extremities/Musculoskeletal: Abrasions " noted to the right hand.  Left leg is in a knee immobilizer with tenderness involving the region of the mid to distal thigh.  Normal pulse and sensation distally.  Neurologic: Alert & oriented. No focal deficits observed.      DIAGNOSTIC STUDIES / PROCEDURES    LABS  Results for orders placed or performed during the hospital encounter of 02/13/23   CBC WITH DIFFERENTIAL   Result Value Ref Range    WBC 10.2 4.8 - 10.8 K/uL    RBC 5.25 4.70 - 6.10 M/uL    Hemoglobin 16.2 14.0 - 18.0 g/dL    Hematocrit 46.1 42.0 - 52.0 %    MCV 87.8 80.0 - 94.0 fL    MCH 30.9 27.0 - 31.0 pg    MCHC 35.1 33.0 - 37.0 g/dL    RDW 12.7 11.5 - 14.5 %    Platelet Count 128 (L) 130 - 400 K/uL    MPV 11.0 (H) 7.4 - 10.4 fL    Neutrophils Automated 85.9 (H) 39.0 - 70.0 %    Lymphocytes Automated 7.7 (L) 21.0 - 50.0 %    Monocytes Automated 5.8 2.0 - 9.0 %    Eosinophils Automated 0.2 0.0 - 5.0 %    Basophils Automated 0.2 0.0 - 3.0 %    Abs Neutrophils Automated 8.8 (H) 1.8 - 7.7 K/uL    Abs Lymph Automated 0.8 (L) 1.2 - 4.8 K/uL    Eosinophil Count, Blood 0.02 0.00 - 0.50 K/uL   Comp Metabolic Panel   Result Value Ref Range    Sodium 142 136 - 145 mmol/L    Potassium 3.6 3.5 - 5.1 mmol/L    Chloride 105 98 - 107 mmol/L    Co2 24 21 - 32 mmol/L    Anion Gap 17 10 - 18 mmol/L    Glucose 110 (H) 74 - 99 mg/dL    Bun 16 7 - 18 mg/dL    Creatinine 0.8 0.8 - 1.3 mg/dL    Calcium 9.1 8.5 - 11.0 mg/dL    AST(SGOT) 20 15 - 37 U/L    ALT(SGPT) 26 12 - 78 U/L    Alkaline Phosphatase 84 46 - 116 U/L    Total Bilirubin 0.8 0.2 - 1.0 mg/dL    Albumin 4.1 3.4 - 5.0 g/dL    Total Protein 6.9 6.4 - 8.2 g/dL    A-G Ratio 1.5    Prothrombin Time   Result Value Ref Range    PT 10.2 9.3 - 11.7 sec    INR 0.94    APTT   Result Value Ref Range    APTT 22.5 (L) 22.8 - 31.5 sec   ESTIMATED GFR   Result Value Ref Range    GFR (CKD-EPI) 98 >60 mL/min/1.73 m 2   CoV-2, Flu A/B, And RSV by PCR ([employer])    Specimen: Nasal; Respirate   Result Value Ref Range    Influenza  virus A RNA Negative Negative    Influenza virus B, PCR Negative Negative    RSV, PCR Not Detected Not Detected    SARS-CoV-2 by PCR Negative Negative    SARS-CoV-2 Source NP         RADIOLOGY  I have independently interpreted the diagnostic imaging associated with this visit and am waiting the final reading from the radiologist.   My preliminary interpretation is a follows: Distal femur fracture  Radiologist interpretation:   DX-FEMUR-2+ LEFT   Final Result         1.  Comminuted distal femoral metadiaphyseal fracture which appears to extend into the knee joint space            COURSE & MEDICAL DECISION MAKING      INITIAL ASSESSMENT, COURSE AND PLAN  Care Narrative: 62-year-old male with a slip and fall on the ice sustaining a distal femur fracture, transferred here for trauma Ortho evaluation and surgical repair.  No other significant injuries.  I consulted Dr. Gamino, Orthopedic surgeon who will plan operative repair.  In the meantime the patient is admitted to the hospitalist in guarded condition      FINAL DIAGNOSIS  1. Closed bicondylar fracture of left femur, initial encounter (HCC)    2. Fall from ground level           Electronically signed by: Landon Martinez M.D., 2/13/2023 8:27 PM

## 2023-02-14 NOTE — ANESTHESIA TIME REPORT
Anesthesia Start and Stop Event Times     Date Time Event    2/14/2023 1149 Ready for Procedure     1149 Anesthesia Start     1355 Anesthesia Stop        Responsible Staff  02/14/23    Name Role Begin End    Rufino Marshall M.D. Anesth 1149 [id number]        Overtime Reason:  no overtime (within assigned shift)    Comments:

## 2023-02-14 NOTE — ED NOTES
Med rec updated and complete. Allergies reviewed. Confirmed name and date of birth.    Pt denies antibiotic use in last 30 days.      [facility] [phone]

## 2023-02-14 NOTE — ANESTHESIA PROCEDURE NOTES
Airway  Performed by: Rufino Marshall M.D.  Authorized by: Rufino Marshall M.D.     Location:  OR  Urgency:  Elective  Indications for Airway Management:  Anesthesia      Spontaneous Ventilation: absent    Sedation Level:  Deep  Preoxygenated: Yes    Final Airway Type:  Supraglottic airway  Final Supraglottic Airway:  Standard LMA    SGA Size:  3  Number of Attempts at Approach:  1

## 2023-02-14 NOTE — ASSESSMENT & PLAN NOTE
Secondary to mechanical fall  S/p left femur ORIF today   Supportive care with pain control   PT/OT for dispo  TTWB  Fall precautions   lovenox for DVT ppx

## 2023-02-15 ENCOUNTER — HOSPITAL ENCOUNTER (INPATIENT)
Facility: REHABILITATION | Age: 63
End: 2023-02-15
Attending: PHYSICAL MEDICINE & REHABILITATION | Admitting: PHYSICAL MEDICINE & REHABILITATION
Payer: OTHER MISCELLANEOUS

## 2023-02-15 PROBLEM — D64.9 ACUTE ANEMIA: Status: ACTIVE | Noted: 2023-02-15

## 2023-02-15 LAB
ANION GAP SERPL CALC-SCNC: 9 MMOL/L (ref 7–16)
BASOPHILS # BLD AUTO: 0.1 % (ref 0–1.8)
BASOPHILS # BLD: 0.01 K/UL (ref 0–0.12)
BUN SERPL-MCNC: 16 MG/DL (ref 8–22)
CALCIUM SERPL-MCNC: 8.2 MG/DL (ref 8.5–10.5)
CHLORIDE SERPL-SCNC: 102 MMOL/L (ref 96–112)
CO2 SERPL-SCNC: 25 MMOL/L (ref 20–33)
CREAT SERPL-MCNC: 0.8 MG/DL (ref 0.5–1.4)
EOSINOPHIL # BLD AUTO: 0 K/UL (ref 0–0.51)
EOSINOPHIL NFR BLD: 0 % (ref 0–6.9)
ERYTHROCYTE [DISTWIDTH] IN BLOOD BY AUTOMATED COUNT: 41.1 FL (ref 35.9–50)
GFR SERPLBLD CREATININE-BSD FMLA CKD-EPI: 98 ML/MIN/1.73 M 2
GLUCOSE SERPL-MCNC: 118 MG/DL (ref 65–99)
HCT VFR BLD AUTO: 25 % (ref 42–52)
HCT VFR BLD AUTO: 25.1 % (ref 42–52)
HCT VFR BLD AUTO: 28.8 % (ref 42–52)
HGB BLD-MCNC: 8.5 G/DL (ref 14–18)
HGB BLD-MCNC: 8.7 G/DL (ref 14–18)
HGB BLD-MCNC: 9.8 G/DL (ref 14–18)
IMM GRANULOCYTES # BLD AUTO: 0.04 K/UL (ref 0–0.11)
IMM GRANULOCYTES NFR BLD AUTO: 0.5 % (ref 0–0.9)
LYMPHOCYTES # BLD AUTO: 0.86 K/UL (ref 1–4.8)
LYMPHOCYTES NFR BLD: 10.2 % (ref 22–41)
MCH RBC QN AUTO: 30.4 PG (ref 27–33)
MCHC RBC AUTO-ENTMCNC: 34 G/DL (ref 33.7–35.3)
MCV RBC AUTO: 89.4 FL (ref 81.4–97.8)
MONOCYTES # BLD AUTO: 1.46 K/UL (ref 0–0.85)
MONOCYTES NFR BLD AUTO: 17.3 % (ref 0–13.4)
NEUTROPHILS # BLD AUTO: 6.07 K/UL (ref 1.82–7.42)
NEUTROPHILS NFR BLD: 71.9 % (ref 44–72)
NRBC # BLD AUTO: 0 K/UL
NRBC BLD-RTO: 0 /100 WBC
PLATELET # BLD AUTO: 135 K/UL (ref 164–446)
PMV BLD AUTO: 10.6 FL (ref 9–12.9)
POTASSIUM SERPL-SCNC: 4.4 MMOL/L (ref 3.6–5.5)
RBC # BLD AUTO: 3.22 M/UL (ref 4.7–6.1)
SODIUM SERPL-SCNC: 136 MMOL/L (ref 135–145)
WBC # BLD AUTO: 8.4 K/UL (ref 4.8–10.8)

## 2023-02-15 PROCEDURE — 97535 SELF CARE MNGMENT TRAINING: CPT

## 2023-02-15 PROCEDURE — 80048 BASIC METABOLIC PNL TOTAL CA: CPT

## 2023-02-15 PROCEDURE — 85025 COMPLETE CBC W/AUTO DIFF WBC: CPT

## 2023-02-15 PROCEDURE — 97166 OT EVAL MOD COMPLEX 45 MIN: CPT

## 2023-02-15 PROCEDURE — 99223 1ST HOSP IP/OBS HIGH 75: CPT | Performed by: PHYSICAL MEDICINE & REHABILITATION

## 2023-02-15 PROCEDURE — 36415 COLL VENOUS BLD VENIPUNCTURE: CPT

## 2023-02-15 PROCEDURE — 99233 SBSQ HOSP IP/OBS HIGH 50: CPT | Performed by: STUDENT IN AN ORGANIZED HEALTH CARE EDUCATION/TRAINING PROGRAM

## 2023-02-15 PROCEDURE — 85014 HEMATOCRIT: CPT

## 2023-02-15 PROCEDURE — 700102 HCHG RX REV CODE 250 W/ 637 OVERRIDE(OP): Performed by: NURSE PRACTITIONER

## 2023-02-15 PROCEDURE — 700102 HCHG RX REV CODE 250 W/ 637 OVERRIDE(OP): Performed by: INTERNAL MEDICINE

## 2023-02-15 PROCEDURE — A9270 NON-COVERED ITEM OR SERVICE: HCPCS | Performed by: NURSE PRACTITIONER

## 2023-02-15 PROCEDURE — A9270 NON-COVERED ITEM OR SERVICE: HCPCS | Performed by: INTERNAL MEDICINE

## 2023-02-15 PROCEDURE — 97162 PT EVAL MOD COMPLEX 30 MIN: CPT

## 2023-02-15 PROCEDURE — 700105 HCHG RX REV CODE 258

## 2023-02-15 PROCEDURE — 85018 HEMOGLOBIN: CPT | Mod: 91

## 2023-02-15 PROCEDURE — 99024 POSTOP FOLLOW-UP VISIT: CPT | Performed by: STUDENT IN AN ORGANIZED HEALTH CARE EDUCATION/TRAINING PROGRAM

## 2023-02-15 PROCEDURE — 770006 HCHG ROOM/CARE - MED/SURG/GYN SEMI*

## 2023-02-15 RX ORDER — SODIUM CHLORIDE 9 MG/ML
500 INJECTION, SOLUTION INTRAVENOUS ONCE
Status: COMPLETED | OUTPATIENT
Start: 2023-02-15 | End: 2023-02-15

## 2023-02-15 RX ADMIN — THYROID, PORCINE 240 MG: 120 TABLET ORAL at 04:55

## 2023-02-15 RX ADMIN — SODIUM CHLORIDE 500 ML: 9 INJECTION, SOLUTION INTRAVENOUS at 06:22

## 2023-02-15 RX ADMIN — SODIUM CHLORIDE 500 ML: 9 INJECTION, SOLUTION INTRAVENOUS at 04:56

## 2023-02-15 RX ADMIN — OXYCODONE 5 MG: 5 TABLET ORAL at 09:01

## 2023-02-15 RX ADMIN — GABAPENTIN 600 MG: 300 CAPSULE ORAL at 21:02

## 2023-02-15 RX ADMIN — DOCUSATE SODIUM 300 MG: 100 CAPSULE, LIQUID FILLED ORAL at 21:02

## 2023-02-15 ASSESSMENT — ENCOUNTER SYMPTOMS
NERVOUS/ANXIOUS: 0
FOCAL WEAKNESS: 0
NAUSEA: 0
MYALGIAS: 0
SHORTNESS OF BREATH: 0
BLURRED VISION: 0
INSOMNIA: 1
SPEECH CHANGE: 0
FEVER: 0
ABDOMINAL PAIN: 0
VOMITING: 0
SENSORY CHANGE: 0

## 2023-02-15 ASSESSMENT — COGNITIVE AND FUNCTIONAL STATUS - GENERAL
STANDING UP FROM CHAIR USING ARMS: A LITTLE
DAILY ACTIVITIY SCORE: 22
SUGGESTED CMS G CODE MODIFIER DAILY ACTIVITY: CJ
MOVING FROM LYING ON BACK TO SITTING ON SIDE OF FLAT BED: A LITTLE
HELP NEEDED FOR BATHING: A LITTLE
SUGGESTED CMS G CODE MODIFIER MOBILITY: CK
CLIMB 3 TO 5 STEPS WITH RAILING: TOTAL
WALKING IN HOSPITAL ROOM: A LITTLE
MOBILITY SCORE: 16
MOVING TO AND FROM BED TO CHAIR: A LITTLE
DRESSING REGULAR LOWER BODY CLOTHING: A LITTLE
TURNING FROM BACK TO SIDE WHILE IN FLAT BAD: A LITTLE

## 2023-02-15 ASSESSMENT — GAIT ASSESSMENTS
DISTANCE (FEET): 15
ASSISTIVE DEVICE: FRONT WHEEL WALKER
GAIT LEVEL OF ASSIST: MINIMAL ASSIST

## 2023-02-15 ASSESSMENT — LIFESTYLE VARIABLES: SUBSTANCE_ABUSE: 0

## 2023-02-15 ASSESSMENT — ACTIVITIES OF DAILY LIVING (ADL): TOILETING: INDEPENDENT

## 2023-02-15 ASSESSMENT — PAIN DESCRIPTION - PAIN TYPE: TYPE: ACUTE PAIN

## 2023-02-15 NOTE — PROGRESS NOTES
"   Orthopaedic Progress Note    Interval changes:  Patient doing well post op  Dressings CDI  Cleared for DC home by ortho pending medicine clearance    ROS - Patient denies any new issues.  Pain well controlled.    /64   Pulse (!) 124   Temp 36.6 °C (97.8 °F) (Temporal)   Resp 18   Ht 1.727 m (5' 8\")   Wt 70.4 kg (155 lb 3.3 oz)   SpO2 95%       Patient seen and examined  No acute distress  Breathing non labored  RRR  LLE Surgical dressing is clean, dry, and intact. Patient clearly fires tibialis anterior, EHL, and gastrocnemius/soleus. Sensation is intact to light touch throughout superficial peroneal, deep peroneal, tibial, saphenous, and sural nerve distributions. Strong and palpable 2+ dorsalis pedis and posterior tibial pulses with capillary refill less than 2 seconds. No lower leg tenderness or discomfort.     Recent Labs     02/13/23  1650 02/14/23  0032 02/15/23  0415 02/15/23  1114   WBC 10.2 9.2 8.4  --    RBC 5.25 4.28* 3.22*  --    HEMOGLOBIN 16.2 13.3* 9.8* 8.5*   HEMATOCRIT 46.1 37.6* 28.8* 25.0*   MCV 87.8 87.9 89.4  --    MCH 30.9 31.1 30.4  --    MCHC 35.1 35.4* 34.0  --    RDW 12.7 41.4 41.1  --    PLATELETCT 128* 137* 135*  --    MPV 11.0* 11.1 10.6  --        Active Hospital Problems    Diagnosis     Closed left subtrochanteric femur fracture, initial encounter (formerly [facility]) [S72.22XA]     Thrombocytopenia (formerly [facility]) [D69.6]     Hypothyroidism [E03.9]     Primary insomnia [F51.01]        Assessment/Plan:  Patient doing well post op  Dressings CDI  Cleared for DC home by ortho pending medicine clearance  POD#1 S/P Left femur open reduction internal fixation with retrograde intramedullary nail   Wt bearing status - TTWB  Wound care/Drains - Dressings to be changed every other day by nursing  Future Procedures - none planned   Lovenox: Start cleared by ortho, Duration-until ambulatory > 150'  Sutures/Staples out- 14 days post operatively  PT/OT-initiated  Antibiotics: Perioperative completed  DVT " Prophylaxis- TEDS/SCDs/Foot pumps  [place]-none  Case Coordination for Discharge Planning - Disposition home

## 2023-02-15 NOTE — PROGRESS NOTES
Alert and able to let his needs known, waffle cushion in place and heel/sacral mepilex applied. Able to get out of bed with toe touch baring to left leg and fww. Minimal pain to left leg when he's not moving it but requests oxy as needed    Notified pedro em regarding tachycardia in the 130's; new orders received  Notified pedro robertson regarding morning vitals; labs and bolus again to be ordered  Received another bolus order for morning vitals along with occult stool collection

## 2023-02-15 NOTE — THERAPY
"Physical Therapy   Initial Evaluation     Patient Name: Christian Chilel  Age:  62 y.o., Sex:  male  Medical Record #: [id number]  Today's Date: 2/15/2023     Precautions  Precautions: Toe Touch Weight Bearing Left Lower Extremity    Assessment  Patient is 62 y.o. male s/p L hip ORIF occurring on 2/14 after sustaining a fall. He is TTWB of the LLE at this time.    Pt lives alone and has 3 steps to enter his home. He required MIN A for short distance ambulation. He was able to maintain TTWB of the LLE throughout transfers and ambulation today. Pt is an excellent acute rehabilitation candidate as he is motivated, has adequate strength and endurance to participate in intensive therapy.         Plan    Physical Therapy Initial Treatment Plan   Treatment Plan : Bed Mobility, Equipment, Group Therapy, Gait Training, Manual Therapy, Neuro Re-Education / Balance, Self Care / Home Evaluation, Stair Training, Therapeutic Activities, Therapeutic Exercise  Treatment Frequency: 4 Times per Week  Duration: Until Therapy Goals Met    DC Equipment Recommendations: Unable to determine at this time  Discharge Recommendations: Recommend post-acute placement for additional physical therapy services prior to discharge home       Subjective    \"I want to get up and moving\". Pt does endorse pain in his L knee however no numerical value provided and does not interfere with session.      Objective       02/15/23 1020   Precautions   Precautions Toe Touch Weight Bearing Left Lower Extremity   Prior Living Situation   Housing / Facility 1 Story House   Steps Into Home 3   Steps In Home 0   Equipment Owned None   Lives with - Patient's Self Care Capacity Alone and Able to Care For Self   Prior Level of Functional Mobility   Bed Mobility Independent   Transfer Status Independent   Ambulation Independent   Distance Ambulation (Feet)   (community)   Stairs Independent   Strength Lower Body   Lower Body Strength  X   Rt Hip Flexion Strength 4 (G)   Rt " Knee Extension Strength 5 (N)   Lt Knee Extension Strength 2- (P-)   Bed Mobility    Supine to Sit Standby Assist   Sit to Supine Minimal Assist   Gait Analysis   Gait Level Of Assist Minimal Assist   Assistive Device Front Wheel Walker   Distance (Feet) 15   # of Times Distance was Traveled 1   Deviation   (TTWB LLE)   Weight Bearing Status LLE TTWB   Comments Pt ambulated 15' with FWW and MIN A. Pt was able to maintain TTWB of LLE throughout ambulation via hopping strategy of RLE. He intermittently rested his L leg on the ground as per TTWB precautions.   Functional Mobility   Sit to Stand Contact Guard Assist   How much difficulty does the patient currently have...   Turning over in bed (including adjusting bedclothes, sheets and blankets)? 3   Sitting down on and standing up from a chair with arms (e.g., wheelchair, bedside commode, etc.) 3   Moving from lying on back to sitting on the side of the bed? 3   How much help from another person does the patient currently need...   Moving to and from a bed to a chair (including a wheelchair)? 3   Need to walk in a hospital room? 3   Climbing 3-5 steps with a railing? 1   6 clicks Mobility Score 16   Short Term Goals    Short Term Goal # 1 Pt will ambulate 50' with FWW and supervision while maintaining TTWB of LLE   Short Term Goal # 2 Pt will perform 3 stairs with B handrails with supervision while maintaining TTWB within 6 visits.   Short Term Goal # 3 Pt will perform bed mobility including supine<>sit and rolling L/R with supervision within 6 visits.   Education Group   Education Provided Role of Physical Therapist;Gait Training;Use of Assistive Device;Weight Bearing Status;Weight Bearing Precautions   Gait Training Patient Response Patient;Eager;Acceptance;Explanation;Demonstration   Use of Assistive Device Patient Response Patient;Explanation;Demonstration;Acceptance;Verbal Demonstration;Action Demonstration   Weight Bearing Status Patient Response  Patient;Explanation;Demonstration;Eager;Verbal Demonstration;Action Demonstration   Weight Bearing Precautions Patient Response Verbal Demonstration;Action Demonstration;Explanation;Patient;Acceptance   Additional Comments Pt was provided on importance of performing L knee AROM including heel slides and quad set, he performed each exercise x 5 through a limited ROM.   Physical Therapy Initial Treatment Plan    Treatment Plan  Bed Mobility;Equipment;Group Therapy;Gait Training;Manual Therapy;Neuro Re-Education / Balance;Self Care / Home Evaluation;Stair Training;Therapeutic Activities;Therapeutic Exercise   Treatment Frequency 4 Times per Week   Duration Until Therapy Goals Met   Problem List    Problems Pain;Impaired Transfers;Impaired Bed Mobility;Impaired Ambulation;Functional Strength Deficit;Functional ROM Deficit;Impaired Balance;Impaired Coordination;Impaired Vision;Decreased Activity Tolerance;Limited Knowledge of Post-Op Precautions   Anticipated Discharge Equipment and Recommendations   DC Equipment Recommendations Unable to determine at this time   Discharge Recommendations Recommend post-acute placement for additional physical therapy services prior to discharge home   Interdisciplinary Plan of Care Collaboration   IDT Collaboration with  Nursing;Physician;Occupational Therapist   Patient Position at End of Therapy In Bed;Bed Alarm On;Call Light within Reach;Tray Table within Reach;Phone within Reach   Session Information   Date / Session Number  2/15, 1 (1/4 2/21)

## 2023-02-15 NOTE — CONSULTS
Physical Medicine and Rehabilitation Consultation              Date of initial consultation: 2/15/2023  Requesting provider: Rafia Blankenship M.D. at 02/15/23 1411   Consulting provider: Anjelica Guzman D.O.  Reason for consultation: assess for acute inpatient rehab appropriateness  LOS: 2 Day(s)    Chief complaint: Left leg pain aft GLF     HPI: The patient is a 62 y.o. male with a past medical history of hypothyroidism and insomnia;  who presented on 2/13/2023  8:12 PM with left leg pain after a GLF. Per documentation, patient had a fall, di dnot hit his head and did not have a syncopal episode. Upon eval in the ED, images obtained showed a left comminuted distal femur fracture. Ortho was consulted, and patient was taken to the OR on 2/14 for left femur ORIF with retrograde IM nail performed by Dr. Gamino. Post op, patient is TTWB. He has post op ABLA with Hgb down to 8.5 Patient has been hypotension, he required IV fluids and fecal occult testing is pending.     Patient seen and examined at bedside. Reports he feels ok. Tells me he got up to go to the bathroom easily today, confirmed with nurse. Nursing tells me he was MOD I with FWW to bathroom earlier.   Reviewed rehab options with patient, and need for ramp to access home. Patient tells me he is absolutely not interested in post acute rehab, he will have his landlord build him a ramp, and that he is only willing to consider rehab at home. I reviewed with patient that it is unlikely home health goes to house in [place]. Patient's only complaint today is that he can't eat the food in the hospital, he adheres to an areveydic diet and hasn't had much to eat today because he doesn't like the food.     Social Hx:  Patient lives alone in a 1 story house with 3 ALDA   3 ALDA  At prior level of function patient was Independent with mobility and ADLs.     Tobacco: Denies   Alcohol: Occasional   Drugs: Denies     THERAPY:  Restrictions: Fall Risk, TTWB LLE   PT:  Functional mobility   2/15 Min A FWW x 15 ft, Min A    OT: ADLs  2/15 Min A lower body dressing     SLP:   None     IMAGING:  DX-PORTABLE FLUORO > 1 HOUR   Final Result       Intraoperative evaluation of left femur fracture ORIF.       DX-FEMUR-2+ LEFT   Final Result       Intraoperative evaluation of left femur fracture ORIF.       OUTSIDE IMAGES-DX PELVIS   Final Result       OUTSIDE IMAGES-DX LOWER EXTREMITY, LEFT   Final Result       CT-FOREIGN FILM CAT SCAN   Final Result       DX-FEMUR-2+ LEFT   Final Result           1.  Comminuted distal femoral metadiaphyseal fracture which appears to extend into the knee joint space           PROCEDURES:  2/14 Left femur open reduction internal fixation with retrograde intramedullary nail performed by Dr. Gamino     PMH:  Past Medical History:   Diagnosis Date    History of chickenpox     Insomnia        PSH:  Past Surgical History:   Procedure Laterality Date    FEMUR NAILING INTRAMEDULLARY Left 2/14/2023    Procedure: INSERTION, INTRAMEDULLARY VEDA, FEMUR RETROGRADE;  Surgeon: Rick Gamino M.D.;  Location: SURGERY [facility];  Service: Orthopedics    HERNIA REPAIR      TONSILLECTOMY         FHX:  Family History   Problem Relation Age of Onset    Cancer Father        Medications:  Current Facility-Administered Medications   Medication Dose    docusate sodium (COLACE) capsule 300 mg  300 mg    gabapentin (NEURONTIN) capsule 600 mg  600 mg    thyroid (ARMOUR THYROID) tablet 240 mg  240 mg    polyethylene glycol/lytes (MIRALAX) PACKET 1 Packet  1 Packet    And    magnesium hydroxide (MILK OF MAGNESIA) suspension 30 mL  30 mL    And    bisacodyl (DULCOLAX) suppository 10 mg  10 mg    acetaminophen (Tylenol) tablet 650 mg  650 mg    ondansetron (ZOFRAN) syringe/vial injection 4 mg  4 mg    ondansetron (ZOFRAN ODT) dispertab 4 mg  4 mg    promethazine (PHENERGAN) tablet 12.5-25 mg  12.5-25 mg    promethazine (PHENERGAN) suppository 12.5-25 mg  12.5-25 mg     "prochlorperazine (COMPAZINE) injection 5-10 mg  5-10 mg    Pharmacy Consult Request ...Pain Management Review 1 Each  1 Each    oxyCODONE immediate-release (ROXICODONE) tablet 2.5 mg  2.5 mg    Or    oxyCODONE immediate-release (ROXICODONE) tablet 5 mg  5 mg    Or    HYDROmorphone (Dilaudid) injection 0.25 mg  0.25 mg       Allergies:  Allergies   Allergen Reactions    Food Anaphylaxis     [place]       Physical Exam:  Vitals: /64   Pulse (!) 124   Temp 36.6 °C (97.8 °F) (Temporal)   Resp 18   Ht 1.727 m (5' 8\")   Wt 70.4 kg (155 lb 3.3 oz)   SpO2 95%   Gen: NAD, laying comfortably in bed   Head:  NC/AT  Eyes/ Nose/ Mouth: PERRLA, moist mucous membranes  Cardio: RRR, good distal perfusion, warm extremities  Pulm: normal respiratory effort, no cyanosis, breathing comfortably on RA   Abd: Soft NTND, negative borborygmi   Ext: ace wrap to LLE     Mental status:  A&Ox4 (person, place, date, situation) answers questions appropriately follows commands  Speech: fluent, no aphasia or dysarthria    CRANIAL NERVES:  2,3: visual acuity grossly intact, PERRL  3,4,6: EOMI bilaterally, no nystagmus or diplopia  5: sensation intact to light touch bilaterally and symmetric  7: no facial asymmetry  8: hearing grossly intact    Motor:      Upper Extremity  Myotome R L   Shoulder flexion C5 5/5 5/5   Elbow flexion C5 5/5 5/5   Wrist extension C6 5/5 5/5   Elbow extension C7 5/5 5/5   Finger flexion C8 5/5 5/5   Finger abduction T1 5/5 5/5     Lower Extremity Myotome R L   Hip flexion L2 5/5 2/5   Knee extension L3 5/5 2/5   Ankle dorsiflexion L4 5/5 4/5   Toe extension L5 5/5 4/5   Ankle plantarflexion S1 5/5 4/5       Negative Pronator drift bilaterally    Sensory:   intact to light touch through out      DTRs: 2+ in bilateral  biceps  Negative [place] b/l     Tone: no spasticity noted, no cogwheeling noted    Coordination:   intact finger to nose bilaterally  intact fine motor with fingers bilaterally      Labs: Reviewed " and significant for   Recent Labs     02/13/23 1650 02/14/23  0032 02/15/23  0415 02/15/23  1114   RBC 5.25 4.28* 3.22*  --    HEMOGLOBIN 16.2 13.3* 9.8* 8.5*   HEMATOCRIT 46.1 37.6* 28.8* 25.0*   PLATELETCT 128* 137* 135*  --    PROTHROMBTM 10.2  --   --   --    APTT 22.5*  --   --   --    INR 0.94  --   --   --      Recent Labs     02/13/23  1650 02/14/23  0032 02/15/23  0415   SODIUM 142 137 136   POTASSIUM 3.6 3.9 4.4   CHLORIDE 105 104 102   CO2 24 23 25   GLUCOSE 110* 153* 118*   BUN 16 18 16   CREATININE 0.8 0.66 0.80   CALCIUM 9.1 8.4* 8.2*     Recent Results (from the past 24 hour(s))   CBC WITH DIFFERENTIAL    Collection Time: 02/15/23  4:15 AM   Result Value Ref Range    WBC 8.4 4.8 - 10.8 K/uL    RBC 3.22 (L) 4.70 - 6.10 M/uL    Hemoglobin 9.8 (L) 14.0 - 18.0 g/dL    Hematocrit 28.8 (L) 42.0 - 52.0 %    MCV 89.4 81.4 - 97.8 fL    MCH 30.4 27.0 - 33.0 pg    MCHC 34.0 33.7 - 35.3 g/dL    RDW 41.1 35.9 - 50.0 fL    Platelet Count 135 (L) 164 - 446 K/uL    MPV 10.6 9.0 - 12.9 fL    Neutrophils-Polys 71.90 44.00 - 72.00 %    Lymphocytes 10.20 (L) 22.00 - 41.00 %    Monocytes 17.30 (H) 0.00 - 13.40 %    Eosinophils 0.00 0.00 - 6.90 %    Basophils 0.10 0.00 - 1.80 %    Immature Granulocytes 0.50 0.00 - 0.90 %    Nucleated RBC 0.00 /100 WBC    Neutrophils (Absolute) 6.07 1.82 - 7.42 K/uL    Lymphs (Absolute) 0.86 (L) 1.00 - 4.80 K/uL    Monos (Absolute) 1.46 (H) 0.00 - 0.85 K/uL    Eos (Absolute) 0.00 0.00 - 0.51 K/uL    Baso (Absolute) 0.01 0.00 - 0.12 K/uL    Immature Granulocytes (abs) 0.04 0.00 - 0.11 K/uL    NRBC (Absolute) 0.00 K/uL   Basic Metabolic Panel    Collection Time: 02/15/23  4:15 AM   Result Value Ref Range    Sodium 136 135 - 145 mmol/L    Potassium 4.4 3.6 - 5.5 mmol/L    Chloride 102 96 - 112 mmol/L    Co2 25 20 - 33 mmol/L    Glucose 118 (H) 65 - 99 mg/dL    Bun 16 8 - 22 mg/dL    Creatinine 0.80 0.50 - 1.40 mg/dL    Calcium 8.2 (L) 8.5 - 10.5 mg/dL    Anion Gap 9.0 7.0 - 16.0   ESTIMATED  GFR    Collection Time: 02/15/23  4:15 AM   Result Value Ref Range    GFR (CKD-EPI) 98 >60 mL/min/1.73 m 2   HEMOGLOBIN AND HEMATOCRIT    Collection Time: 02/15/23 11:14 AM   Result Value Ref Range    Hemoglobin 8.5 (L) 14.0 - 18.0 g/dL    Hematocrit 25.0 (L) 42.0 - 52.0 %         ASSESSMENT:  Patient is a 62 y.o. male admitted with distal femur fracture     [facility] Code / Diagnosis to Support: 0008.2 - Orthopaedic Disorders: Status Post Femur (Shaft) Fracture    Rehabilitation: Impaired ADLs and mobility  Patient is a good candidate for inpatient rehab based on needs for PT, OT, however patient is resistant to considering post acute care. Is only willing to do home health therapies.     Barriers to transfer include: Insurance authorization, TCCs to verify disposition, medical clearance and bed availability     Additional Recommendations:  Distal femur fracture  - sustained during GLF  - s/p ORIF and IM nail of left femur by Dr. Gamino  - TTWB LLE  - continue with PT/OT; patient is willing to do therapy but does not want post acute rehab  - is only willing to do home health, reviewed with patient [place]  - patient would only be safe for home with home health if he has access to home health and has a ramp on his stairs     ABLA  - post op drop in Hgb  -2/15 Hgb 8.5   - primary team monitoring Hgb, fecal occult test pending     Hypothyroidism   - home dose armour thyroid     Hypotension   - at risk for orthostatic hypotension   - requiring IVFs for improvement of hypotension    Dispo:  - patient is currently functioning below their level of baseline, recommend post acute rehab  - recommend IRF level therapy with 3hr of therapy 5 days per week, but patient only willing to do therapy at home   - piror to acceptance to IRF, will need insurance auth and patient will need to be agreeable   - would only support DC home alone if patient has a ramp, home health services, and  demonstrates ability to be safe with therapies prior to DC home     Medical Complexity:  Distal femur fracture  ABLA  Hypothyroidism   Hypotension   Impaired mobility and ADLs       DVT PPX: SCDs       Thank you for allowing us to participate in the care of this patient.     Patient was seen for 86 minutes on unit/floor of which > 50% of time was spent on counseling and coordination of care regarding the above, including prognosis, risk reduction, benefits of treatment, and options for next stage of care.    Anjelica Guzman D.O.   Physical Medicine and Rehabilitation     Please note that this dictation was created using voice recognition software. I have made every reasonable attempt to correct obvious errors, but there may be errors of grammar and possibly content that I did not discover before finalizing the note.

## 2023-02-15 NOTE — PROGRESS NOTES
Hypotensive/tachycardic overnight but stable  Drop in Hgb (9)  Getting bolus  50cc EBL in surgery  Checking fecal occult      TTWB, ROM as tolerated  PT/OT  Dispo planning  Recommend DVT ppx lovenox/ASA when able and appropriate   Follow up 2 weeks      Rick Gamino MD  Orthopedic Trauma Surgery

## 2023-02-15 NOTE — HOSPITAL COURSE
Christian Chilel is a 62 y.o. male with a past medical history of hypothyroidism and insomnia who presented 2/13/2023 after sustaining a mechanical, ground-level fall. On admission found to have left femoral fracture. Laboratory evaluation was largely unremarkable.  Orthopedic surgery was consulted and patient was taken back to the OR today for intervention.

## 2023-02-15 NOTE — DISCHARGE PLANNING
[facility] Transitional Care Coordination    Referral from:  Dr. Blankenship  Insurance Provider on Facesheet:  [facility] PAR to verify insurance benefits.    Potential Rehab diagnosis:  Unilateral femur fracture    Chart review indicates patient has ongoing medical management and may have therapy needs to possibly meet inpatient rehab facility criteria with the goal of returning to community.      D/C Support:  To be determined - no contact listed on face sheet    Physiatry consult pended while waiting for additional information.  GLF - left distal femur fracture, POD 1 surgical treatment.  Would welcome OT as clinically appropriate.  TCC will monitor.  Please reach out sooner if PMR consult requested for medical management.     Last Covid test: 2/13/2023 not detected    Thank you for the referral.

## 2023-02-15 NOTE — PROGRESS NOTES
Hospital Medicine Daily Progress Note    Date of Service  2/14/2023    Chief Complaint  Christian Chilel is a 62 y.o. male admitted 2/13/2023 with Rt femur fracture     Hospital Course  Christian Chilel is a 62 y.o. male with a past medical history of hypothyroidism and insomnia who presented 2/13/2023 after sustaining a mechanical, ground-level fall. On admission found to have left femoral fracture. Laboratory evaluation was largely unremarkable.  Orthopedic surgery was consulted and patient was taken back to the OR today for intervention.     Interval Problem Update  Pt seen post operatively, left femure ORIF, pain controlled. Has concerns about his discharge plan.   - PT/OT ordered, TTWB  - supportive care with pain control   - dispo pending therapy evaluation   - lovenox for chemical DVT ppx     I have discussed this patient's plan of care and discharge plan at IDT rounds today with Case Management, Nursing, Nursing leadership, and other members of the IDT team.    Consultants/Specialty  orthopedics    Code Status  Full Code    Disposition  Patient is not medically cleared for discharge.   Anticipate discharge to  TBD .  I have placed the appropriate orders for post-discharge needs.    Review of Systems  Review of Systems   Constitutional:  Negative for fever.   Eyes:  Negative for blurred vision.   Respiratory:  Negative for shortness of breath.    Cardiovascular:  Negative for chest pain.   Gastrointestinal:  Negative for abdominal pain, nausea and vomiting.   Genitourinary:  Negative for dysuria and urgency.   Musculoskeletal:  Positive for joint pain. Negative for myalgias.   Neurological:  Negative for sensory change, speech change and focal weakness.   Psychiatric/Behavioral:  Negative for substance abuse. The patient has insomnia. The patient is not nervous/anxious.       Physical Exam  Temp:  [36.4 °C (97.5 °F)-37.4 °C (99.4 °F)] 36.6 °C (97.8 °F)  Pulse:  [] 125  Resp:  [12-28] 18  BP:  ()/() 99/74  SpO2:  [93 %-100 %] 95 %    Physical Exam  Vitals and nursing note reviewed.   Constitutional:       Appearance: He is normal weight. He is not ill-appearing or toxic-appearing.   HENT:      Head: Normocephalic and atraumatic.   Eyes:      Conjunctiva/sclera: Conjunctivae normal.   Cardiovascular:      Rate and Rhythm: Regular rhythm. Tachycardia present.   Pulmonary:      Breath sounds: Normal breath sounds.   Abdominal:      General: Bowel sounds are normal.      Palpations: Abdomen is soft.   Musculoskeletal:      Cervical back: Neck supple.      Comments: Post op Left femur dressing, sensory intact.    Skin:     General: Skin is warm.   Neurological:      Mental Status: He is alert and oriented to person, place, and time. Mental status is at baseline.   Psychiatric:         Mood and Affect: Mood normal.         Behavior: Behavior normal.         Thought Content: Thought content normal.       Fluids    Intake/Output Summary (Last 24 hours) at 2/14/2023 1804  Last data filed at 2/14/2023 1400  Gross per 24 hour   Intake 1290 ml   Output 200 ml   Net 1090 ml       Laboratory  Recent Labs     02/13/23  1650 02/14/23  0032   WBC 10.2 9.2   RBC 5.25 4.28*   HEMOGLOBIN 16.2 13.3*   HEMATOCRIT 46.1 37.6*   MCV 87.8 87.9   MCH 30.9 31.1   MCHC 35.1 35.4*   RDW 12.7 41.4   PLATELETCT 128* 137*   MPV 11.0* 11.1     Recent Labs     02/13/23  1650 02/14/23  0032   SODIUM 142 137   POTASSIUM 3.6 3.9   CHLORIDE 105 104   CO2 24 23   GLUCOSE 110* 153*   BUN 16 18   CREATININE 0.8 0.66   CALCIUM 9.1 8.4*     Recent Labs     02/13/23  1650   APTT 22.5*   INR 0.94               Imaging  DX-PORTABLE FLUORO > 1 HOUR   Final Result      Intraoperative evaluation of left femur fracture ORIF.      DX-FEMUR-2+ LEFT   Final Result      Intraoperative evaluation of left femur fracture ORIF.      OUTSIDE IMAGES-DX PELVIS   Final Result      OUTSIDE IMAGES-DX LOWER EXTREMITY, LEFT   Final Result      CT-FOREIGN FILM  CAT SCAN   Final Result      DX-FEMUR-2+ LEFT   Final Result         1.  Comminuted distal femoral metadiaphyseal fracture which appears to extend into the knee joint space           Assessment/Plan  * Closed left subtrochanteric femur fracture, initial encounter (HCC)- (present on admission)  Assessment & Plan  Secondary to mechanical fall  S/p left femur ORIF today   Supportive care with pain control   PT/OT for dispo  TTWB  Fall precautions   lovenox for DVT ppx     Thrombocytopenia (HCC)  Assessment & Plan  Plt 128  monitor    Primary insomnia- (present on admission)  Assessment & Plan  neurontin QHS    Hypothyroidism- (present on admission)  Assessment & Plan  Continue home armour thyroid         VTE prophylaxis: enoxaparin ppx    I have performed a physical exam and reviewed and updated ROS and Plan today (2/14/2023). In review of yesterday's note (2/13/2023), there are no changes except as documented above.

## 2023-02-15 NOTE — FACE TO FACE
Face to Face Note  -  Durable Medical Equipment    Rafia Blankenship M.D. - NPI: [id number]  I certify that this patient is under my care and that they have had a durable medical equipment(DME)face to face encounter by myself that meets the physician DME face-to-face encounter requirements with this patient on:    Date of encounter:   Patient:                    MRN:                       YOB: 20[birth year]  Christian Chilel  [id number]  1960     The encounter with the patient was in whole, or in part, for the following medical condition, which is the primary reason for durable medical equipment:  Post-Op Surgery    I certify that, based on my findings, the following durable medical equipment is medically necessary:  Walkers and Other DME Equipment - shower chair .        ------------------------------------------------------------------------------------------------------------------    Face to Face Supporting Documentation - Home Health    The encounter with this patient was in whole or in part the primary reason for home health admission.    Date of encounter:   Patient:                    MRN:                       YOB: 2025  Christian Chilel  [id number]  1960     Home health to see patient for:  Skilled Nursing care for assessment, interventions & education, Physical Therapy evaluation and treatment, and Occupational therapy evaluation and treatment    Skilled need for:  Surgical Aftercare s/p left hip fracture ORIF    Skilled nursing interventions to include:  Comment: home eval    Homebound evidenced status by:  Need the aid of supportive devices such as crutches, canes, wheelchairs or walkers. Leaving home must require a considerable and taxing effort. There must exist a normal inability to leave the home.    Community Physician to provide follow up care: Les Zamorano P.A.-C.     Optional Interventions    Wound information & treatment:    Home Infusion Therapy  orders:    Line/Drain/Airway:    I certify the face to face encounter for this home care referral meets the CMS requirements and the encounter/clinical assessment with the patient was, in whole, or in part, for the medical condition(s) listed above, which is the primary reason for home health care. Based on my clinical findings: the service(s) are medically necessary, support the need for home health care, and the homebound criteria are met.  I certify that this patient has had a face to face encounter by myself.  Rafia Blankenship M.D. - NPI: [id number]    *Debility, frailty and advanced age in the absence of an acute deterioration or exacerbation of a condition do not qualify a patient for home health.

## 2023-02-15 NOTE — THERAPY
Occupational Therapy   Initial Evaluation     Patient Name: Christian Chilel  Age:  62 y.o., Sex:  male  Medical Record #: [id number]  Today's Date: 2/15/2023     Precautions  Precautions: (P) Fall Risk, Toe Touch Weight Bearing Left Lower Extremity    Assessment    Patient is 62 y.o. male admitted after slipping on ice, sustained left femur fracture s/p ORIF (TTWB LLE, ROM as tolerated). Pt normally independent with functional mobility and ADLs living in a [facility] alone. Pt required Joyce for functional mobility and lower body ADLs with FWW, pt eager to dsicharge home but would benefit from continued skilled therapy while admitted as well as recommend post-acute placement to maximize functional independence.       Plan    Occupational Therapy Initial Treatment Plan   Treatment Interventions: (P) Self Care / Activities of Daily Living, Adaptive Equipment, Therapeutic Exercises, Neuro Re-Education / Balance, Therapeutic Activity  Treatment Frequency: (P) 3 Times per Week  Duration: (P) Until Therapy Goals Met    DC Equipment Recommendations: (P) Unable to determine at this time  Discharge Recommendations: (P) Recommend post-acute placement for additional occupational therapy services prior to discharge home     Objective       02/15/23 1022   Prior Living Situation   Prior Services None   Housing / Facility 1 Story House   Steps Into Home 3   Steps In Home 0   Bathroom Set up Bathtub / Shower Combination   Equipment Owned None   Lives with - Patient's Self Care Capacity Alone and Able to Care For Self   Prior Level of ADL Function   Self Feeding Independent   Grooming / Hygiene Independent   Bathing Independent   Dressing Independent   Toileting Independent   Prior Level of IADL Function   Medication Management Independent   Laundry Independent   Kitchen Mobility Independent   Finances Independent   Home Management Independent   Shopping Independent   Prior Level Of Mobility Independent Without Device in Community   Driving /  Transportation Driving Independent   Occupation (Pre-Hospital Vocational) Retired Due To Age   History of Falls   History of Falls Yes   Date of Last Fall   (reason for admit)   Precautions   Precautions Fall Risk;Toe Touch Weight Bearing Left Lower Extremity   Pain 0 - 10 Group   Therapist Pain Assessment Nurse Notified;Post Activity Pain Same as Prior to Activity   Cognition    Cognition / Consciousness WDL   Level of Consciousness Alert   Active ROM Upper Body   Active ROM Upper Body  WDL   Dominant Hand Right   Strength Upper Body   Upper Body Strength  WDL   Sensation Upper Body   Upper Extremity Sensation  WDL   Upper Body Muscle Tone   Upper Body Muscle Tone  WDL   Neurological Concerns   Neurological Concerns No   Coordination Upper Body   Coordination WDL   Balance Assessment   Sitting Balance (Static) Fair   Sitting Balance (Dynamic) Fair   Standing Balance (Static) Fair   Standing Balance (Dynamic) Fair   Weight Shift Sitting Fair   Weight Shift Standing Fair   Comments w/ FWW   Bed Mobility    Supine to Sit Supervised   Sit to Supine Minimal Assist   ADL Assessment   Grooming Supervision;Seated   Upper Body Dressing Supervision   Lower Body Dressing Minimal Assist   Toileting   (NT-refused need)   How much help from another person does the patient currently need...   Putting on and taking off regular lower body clothing? 3   Bathing (including washing, rinsing, and drying)? 3   Toileting, which includes using a toilet, bedpan, or urinal? 4   Putting on and taking off regular upper body clothing? 4   Taking care of personal grooming such as brushing teeth? 4   Eating meals? 4   6 Clicks Daily Activity Score 22   Functional Mobility   Sit to Stand Minimal Assist   Transfer Method Stand Step   Mobility bed mobility, in room mobility, back to bed   Comments w/ FWW   Activity Tolerance   Sitting Edge of Bed 8 min   Standing 6 min   Short Term Goals   Short Term Goal # 1 Pt will complete ADL transfers with  supervision   Short Term Goal # 2 Pt will complete LB dressing with supervision AE PRN   Short Term Goal # 3 Pt will complete toileting with supervision   Education Group   Education Provided Role of Occupational Therapist;Weight Bearing Precautions   Role of Occupational Therapist Patient Response Patient;Acceptance;Explanation   Weight Bearing Precautions Patient Response Patient;Acceptance;Explanation   Occupational Therapy Initial Treatment Plan    Treatment Interventions Self Care / Activities of Daily Living;Adaptive Equipment;Therapeutic Exercises;Neuro Re-Education / Balance;Therapeutic Activity   Treatment Frequency 3 Times per Week   Duration Until Therapy Goals Met   Problem List   Problem List Decreased Active Daily Living Skills;Decreased Homemaking Skills;Decreased Functional Mobility;Decreased Activity Tolerance;Impaired Postural Control / Balance;Limited Knowledge of Post Op Precautions   Anticipated Discharge Equipment and Recommendations   DC Equipment Recommendations Unable to determine at this time   Discharge Recommendations Recommend post-acute placement for additional occupational therapy services prior to discharge home   Interdisciplinary Plan of Care Collaboration   IDT Collaboration with  Nursing;Physical Therapist   Patient Position at End of Therapy In Bed;Bed Alarm On;Call Light within Reach;Tray Table within Reach;Phone within Reach   Collaboration Comments RN updated

## 2023-02-15 NOTE — PROGRESS NOTES
NOC HOSPITALIST CROSS COVER    Notified by RN regarding heart rate of 131 blood pressure 91/65.  Patient was also hypotensive and tachycardic yesterday evening and was fluid responsive to rate 1 L bolus.  Orders placed for an additional 500 cc fluid bolus as well as morning labs.  Hemoglobin notable for a 3.5 drop from 13.3-9.8.  Orders placed for fecal occult stool.      A/P:  #Hypotension, tachycardia  -Hemoglobin down from 13.3-9.8.  Patient is status post ORIF of left femur.  Orders placed for fecal occult stool  -500 cc normal saline bolus        -----------------------------------------------------------------------------------------------------------    Electronically signed by:  ETTA Land PA-C  Hospitalist Services

## 2023-02-16 ENCOUNTER — PHARMACY VISIT (OUTPATIENT)
Dept: PHARMACY | Facility: MEDICAL CENTER | Age: 63
End: 2023-02-16
Payer: COMMERCIAL

## 2023-02-16 VITALS
TEMPERATURE: 99.6 F | SYSTOLIC BLOOD PRESSURE: 99 MMHG | WEIGHT: 155.2 LBS | BODY MASS INDEX: 23.52 KG/M2 | DIASTOLIC BLOOD PRESSURE: 57 MMHG | HEIGHT: 68 IN | HEART RATE: 117 BPM | RESPIRATION RATE: 17 BRPM | OXYGEN SATURATION: 96 %

## 2023-02-16 LAB
ANION GAP SERPL CALC-SCNC: 9 MMOL/L (ref 7–16)
BUN SERPL-MCNC: 15 MG/DL (ref 8–22)
CALCIUM SERPL-MCNC: 8.1 MG/DL (ref 8.5–10.5)
CHLORIDE SERPL-SCNC: 106 MMOL/L (ref 96–112)
CO2 SERPL-SCNC: 24 MMOL/L (ref 20–33)
CREAT SERPL-MCNC: 0.58 MG/DL (ref 0.5–1.4)
ERYTHROCYTE [DISTWIDTH] IN BLOOD BY AUTOMATED COUNT: 39.9 FL (ref 35.9–50)
FERRITIN SERPL-MCNC: 317 NG/ML (ref 22–322)
GFR SERPLBLD CREATININE-BSD FMLA CKD-EPI: 108 ML/MIN/1.73 M 2
GLUCOSE SERPL-MCNC: 125 MG/DL (ref 65–99)
HCT VFR BLD AUTO: 24.2 % (ref 42–52)
HCT VFR BLD AUTO: 25.7 % (ref 42–52)
HGB BLD-MCNC: 8.5 G/DL (ref 14–18)
HGB BLD-MCNC: 9.1 G/DL (ref 14–18)
HGB RETIC QN AUTO: 32.7 PG/CELL (ref 29–35)
IMM RETICS NFR: 17.7 % (ref 9.3–17.4)
IRON SATN MFR SERPL: 9 % (ref 15–55)
IRON SERPL-MCNC: 12 UG/DL (ref 50–180)
MCH RBC QN AUTO: 30.9 PG (ref 27–33)
MCHC RBC AUTO-ENTMCNC: 35.1 G/DL (ref 33.7–35.3)
MCV RBC AUTO: 88 FL (ref 81.4–97.8)
PLATELET # BLD AUTO: 104 K/UL (ref 164–446)
PMV BLD AUTO: 11 FL (ref 9–12.9)
POTASSIUM SERPL-SCNC: 4 MMOL/L (ref 3.6–5.5)
RBC # BLD AUTO: 2.75 M/UL (ref 4.7–6.1)
RETICS # AUTO: 0.11 M/UL (ref 0.04–0.06)
RETICS/RBC NFR: 4 % (ref 0.8–2.1)
SODIUM SERPL-SCNC: 139 MMOL/L (ref 135–145)
T4 FREE SERPL-MCNC: 1.54 NG/DL (ref 0.93–1.7)
TIBC SERPL-MCNC: 131 UG/DL (ref 250–450)
TSH SERPL DL<=0.005 MIU/L-ACNC: <0.005 UIU/ML (ref 0.38–5.33)
UIBC SERPL-MCNC: 119 UG/DL (ref 110–370)
VIT B12 SERPL-MCNC: 887 PG/ML (ref 211–911)
WBC # BLD AUTO: 6.9 K/UL (ref 4.8–10.8)

## 2023-02-16 PROCEDURE — 84439 ASSAY OF FREE THYROXINE: CPT

## 2023-02-16 PROCEDURE — 99239 HOSP IP/OBS DSCHRG MGMT >30: CPT | Performed by: STUDENT IN AN ORGANIZED HEALTH CARE EDUCATION/TRAINING PROGRAM

## 2023-02-16 PROCEDURE — 85046 RETICYTE/HGB CONCENTRATE: CPT

## 2023-02-16 PROCEDURE — 84443 ASSAY THYROID STIM HORMONE: CPT

## 2023-02-16 PROCEDURE — 36415 COLL VENOUS BLD VENIPUNCTURE: CPT

## 2023-02-16 PROCEDURE — 700102 HCHG RX REV CODE 250 W/ 637 OVERRIDE(OP): Performed by: INTERNAL MEDICINE

## 2023-02-16 PROCEDURE — 82607 VITAMIN B-12: CPT

## 2023-02-16 PROCEDURE — 85018 HEMOGLOBIN: CPT

## 2023-02-16 PROCEDURE — 83540 ASSAY OF IRON: CPT

## 2023-02-16 PROCEDURE — RXMED WILLOW AMBULATORY MEDICATION CHARGE: Performed by: STUDENT IN AN ORGANIZED HEALTH CARE EDUCATION/TRAINING PROGRAM

## 2023-02-16 PROCEDURE — 80048 BASIC METABOLIC PNL TOTAL CA: CPT

## 2023-02-16 PROCEDURE — 82728 ASSAY OF FERRITIN: CPT

## 2023-02-16 PROCEDURE — A9270 NON-COVERED ITEM OR SERVICE: HCPCS | Performed by: INTERNAL MEDICINE

## 2023-02-16 PROCEDURE — 83550 IRON BINDING TEST: CPT

## 2023-02-16 PROCEDURE — 85014 HEMATOCRIT: CPT

## 2023-02-16 PROCEDURE — 85027 COMPLETE CBC AUTOMATED: CPT

## 2023-02-16 RX ORDER — ACETAMINOPHEN 500 MG
500-1000 TABLET ORAL EVERY 6 HOURS PRN
COMMUNITY
Start: 2023-02-16 | End: 2023-07-12

## 2023-02-16 RX ORDER — OXYCODONE HYDROCHLORIDE 5 MG/1
5 TABLET ORAL EVERY 6 HOURS PRN
Qty: 20 TABLET | Refills: 0 | Status: SHIPPED | OUTPATIENT
Start: 2023-02-16 | End: 2023-02-21

## 2023-02-16 RX ADMIN — THYROID, PORCINE 240 MG: 120 TABLET ORAL at 04:47

## 2023-02-16 NOTE — PROGRESS NOTES
"   Orthopaedic Progress Note    Interval changes:  Patient doing well   Dressings CDI  Cleared for DC home by ortho pending medicine clearance    ROS - Patient denies any new issues.  Pain well controlled.    BP 99/57   Pulse (!) 117   Temp 37.6 °C (99.6 °F) (Temporal)   Resp 17   Ht 1.727 m (5' 8\")   Wt 70.4 kg (155 lb 3.3 oz)   SpO2 96%       Patient seen and examined  No acute distress  Breathing non labored  RRR  LLE surgical dressing is clean, dry, and intact. Patient clearly fires tibialis anterior, EHL, and gastrocnemius/soleus. Sensation is intact to light touch throughout superficial peroneal, deep peroneal, tibial, saphenous, and sural nerve distributions. Strong and palpable 2+ dorsalis pedis and posterior tibial pulses with capillary refill less than 2 seconds. No lower leg tenderness or discomfort.     Recent Labs     02/14/23  0032 02/15/23  0415 02/15/23  1114 02/15/23  1658 02/16/23  0134 02/16/23  0842   WBC 9.2 8.4  --   --  6.9  --    RBC 4.28* 3.22*  --   --  2.75*  --    HEMOGLOBIN 13.3* 9.8*   < > 8.7* 8.5* 9.1*   HEMATOCRIT 37.6* 28.8*   < > 25.1* 24.2* 25.7*   MCV 87.9 89.4  --   --  88.0  --    MCH 31.1 30.4  --   --  30.9  --    MCHC 35.4* 34.0  --   --  35.1  --    RDW 41.4 41.1  --   --  39.9  --    PLATELETCT 137* 135*  --   --  104*  --    MPV 11.1 10.6  --   --  11.0  --     < > = values in this interval not displayed.       Active Hospital Problems    Diagnosis     Acute anemia [D64.9]     Closed left subtrochanteric femur fracture, initial encounter ([facility]) [S72.22XA]     Thrombocytopenia ([facility]) [D69.6]     Hypothyroidism [E03.9]     Primary insomnia [F51.01]        Assessment/Plan:  Patient doing well   Dressings CDI  Cleared for DC home by ortho pending medicine clearance  POD#2 S/P Left femur open reduction internal fixation with retrograde intramedullary nail   Wt bearing status - TTWB  Wound care/Drains - Dressings to be changed every other day by nursing  Future Procedures - " none planned   Lovenox: Start cleared by ortho, Duration-until ambulatory > 150'  Sutures/Staples out- 14 days post operatively  PT/OT-initiated  Antibiotics: Perioperative completed  DVT Prophylaxis- TEDS/SCDs/Foot pumps  [place]-none  Case Coordination for Discharge Planning - Disposition home

## 2023-02-16 NOTE — PROGRESS NOTES
[facility] Medicine Daily Progress Note    Date of Service  2/15/2023    Chief Complaint  Christian Chilel is a 62 y.o. male admitted 2/13/2023 with Rt femur fracture     Hospital Course  Christian Chilel is a 62 y.o. male with a past medical history of hypothyroidism and insomnia who presented 2/13/2023 after sustaining a mechanical, ground-level fall. On admission found to have left femoral fracture. Laboratory evaluation was largely unremarkable.  Orthopedic surgery was consulted and patient was taken back to the OR today for intervention.     Interval Problem Update  Pt seen and examined, no acute issues or complaints. Pain well controlled.   Did well with PT, post acute rehab recommend, was open to rehab but would prefer HH, both physiatry and HH and been ordered to facility dispo  - persistent tachycardia, asymptomatic  - Hgb is down trending post operatively, will monitor closely, transfuse if Hgb <7  - PT/OT ordered, TTWB  - supportive care with pain control     - lovenox for chemical DVT ppx     I have discussed this patient's plan of care and discharge plan at IDT rounds today with Case Management, Nursing, Nursing leadership, and other members of the IDT team.    Consultants/Specialty  orthopedics    Code Status  Full Code    Disposition  Patient is not medically cleared for discharge.   Anticipate discharge to   vs rehab  .  I have placed the appropriate orders for post-discharge needs.    Review of Systems  Review of Systems   Constitutional:  Negative for fever.   Eyes:  Negative for blurred vision.   Respiratory:  Negative for shortness of breath.    Cardiovascular:  Negative for chest pain.   Gastrointestinal:  Negative for abdominal pain, nausea and vomiting.   Genitourinary:  Negative for dysuria and urgency.   Musculoskeletal:  Positive for joint pain. Negative for myalgias.   Neurological:  Negative for sensory change, speech change and focal weakness.   Psychiatric/Behavioral:  Negative for  substance abuse. The patient has insomnia. The patient is not nervous/anxious.       Physical Exam  Temp:  [36.6 °C (97.8 °F)-37.8 °C (100 °F)] 36.6 °C (97.8 °F)  Pulse:  [116-135] 124  Resp:  [17-18] 18  BP: ()/(59-74) 95/67  SpO2:  [93 %-99 %] 95 %    Physical Exam  Vitals and nursing note reviewed.   Constitutional:       Appearance: He is normal weight. He is not ill-appearing or toxic-appearing.   HENT:      Head: Normocephalic and atraumatic.   Eyes:      Conjunctiva/sclera: Conjunctivae normal.   Cardiovascular:      Rate and Rhythm: Regular rhythm. Tachycardia present.   Pulmonary:      Breath sounds: Normal breath sounds.   Abdominal:      General: Bowel sounds are normal.      Palpations: Abdomen is soft.   Musculoskeletal:      Cervical back: Neck supple.      Comments: Post op Left femur dressing, sensory intact.    Skin:     General: Skin is warm.   Neurological:      Mental Status: He is alert and oriented to person, place, and time. Mental status is at baseline.   Psychiatric:         Mood and Affect: Mood normal.         Behavior: Behavior normal.         Thought Content: Thought content normal.       Fluids    Intake/Output Summary (Last 24 hours) at 2/15/2023 1630  Last data filed at 2/15/2023 1400  Gross per 24 hour   Intake 1600 ml   Output 1400 ml   Net 200 ml       Laboratory  Recent Labs     02/13/23  1650 02/14/23  0032 02/15/23  0415 02/15/23  1114   WBC 10.2 9.2 8.4  --    RBC 5.25 4.28* 3.22*  --    HEMOGLOBIN 16.2 13.3* 9.8* 8.5*   HEMATOCRIT 46.1 37.6* 28.8* 25.0*   MCV 87.8 87.9 89.4  --    MCH 30.9 31.1 30.4  --    MCHC 35.1 35.4* 34.0  --    RDW 12.7 41.4 41.1  --    PLATELETCT 128* 137* 135*  --    MPV 11.0* 11.1 10.6  --      Recent Labs     02/13/23  1650 02/14/23  0032 02/15/23  0415   SODIUM 142 137 136   POTASSIUM 3.6 3.9 4.4   CHLORIDE 105 104 102   CO2 24 23 25   GLUCOSE 110* 153* 118*   BUN 16 18 16   CREATININE 0.8 0.66 0.80   CALCIUM 9.1 8.4* 8.2*     Recent Labs      02/13/23  1650   APTT 22.5*   INR 0.94               Imaging  DX-PORTABLE FLUORO > 1 HOUR   Final Result      Intraoperative evaluation of left femur fracture ORIF.      DX-FEMUR-2+ LEFT   Final Result      Intraoperative evaluation of left femur fracture ORIF.      OUTSIDE IMAGES-DX PELVIS   Final Result      OUTSIDE IMAGES-DX LOWER EXTREMITY, LEFT   Final Result      CT-FOREIGN FILM CAT SCAN   Final Result      DX-FEMUR-2+ LEFT   Final Result         1.  Comminuted distal femoral metadiaphyseal fracture which appears to extend into the knee joint space           Assessment/Plan  * Closed left subtrochanteric femur fracture, initial encounter (HCC)- (present on admission)  Assessment & Plan  Secondary to mechanical fall  S/p left femur ORIF today   Supportive care with pain control   PT/OT for dispo  TTWB  Fall precautions   lovenox for DVT ppx     Acute anemia  Assessment & Plan  Acute anemia post operatively, no evidence of bleeding   Check iron panel, reticulocyte count   Monitor Q8H for now, transfuse for Hgb <7    Thrombocytopenia (HCC)  Assessment & Plan  Plt 128  monitor    Primary insomnia- (present on admission)  Assessment & Plan  neurontin QHS    Hypothyroidism- (present on admission)  Assessment & Plan  Continue home armour thyroid  Check thyroid labs         VTE prophylaxis: enoxaparin ppx    I have performed a physical exam and reviewed and updated ROS and Plan today (2/15/2023). In review of yesterday's note (2/14/2023), there are no changes except as documented above.

## 2023-02-16 NOTE — DISCHARGE PLANNING
Meds-to-Beds: Discharge prescription orders listed below delivered to patient's bedside AMY Salazar. Patient counseled and elected to have co-payment billed to patient account.       Current Outpatient Medications   Medication Sig Dispense Refill    oxyCODONE immediate-release (ROXICODONE) 5 MG Tab Take 1 Tablet by mouth every 6 hours as needed for Severe Pain for up to 5 days. 20 Tablet 0      Addie Evans, PharmD

## 2023-02-16 NOTE — DISCHARGE PLANNING
Case Management Discharge Planning    Admission Date: 2/13/2023  GMLOS: 3.2  ALOS: 3    6-Clicks ADL Score: 22  6-Clicks Mobility Score: 16  PT and/or OT Eval ordered: Yes  Post-acute Referrals Ordered: Yes  Post-acute Choice Obtained: No  Has referral(s) been sent to post-acute provider:  Yes      Anticipated Discharge Dispo: Discharge Disposition: D/T to home under [facility]HA care in anticipation of covered skilled care (06)    DME Needed: No    Action(s) Taken: OTHER, TAMIE RN met with pt. Pt refusing SNF at this time, as he is stating he will eat as well at SNF. TAMIE RN discussed with pt other options, pt wanting HH. Pt ok with company that is in his area and covered by insurance. Pt he will also need private caregivers. Informed will most likely will need to be paid out of pocket. Pt asking if HH can help get pts mail and do house hold items. Informed pt HH will not be able to assist with these items. TAMIE RN called pts insurance to verify HH company contracted with, CM RN spoke to Beverly, daniela Paul pt has HH benefits up to 30 days which will be covered, no caregiver benefits. Pt stating will need transport home as well. TAMIE RN checked Medicare website for HH companies that work in the [place] area, list includes [facility], [employer] [facility] and [facility].      1035: CM RN met with pt at bedside and provided caregiver list. Pt stating does not have transport, CM RN informed some of the private caregiver companies can provide transport. TAMIE RN discussed with pt post acute placement is recommended, pt stating again does not want to go to SNF/Rehab.     1118: CM RN followed up with [facility] . [facility]  declined as they do not have staff to accomodates pts location. Referral forwarded to [facility].     1314: CM RN followed up with , have not received fax. Re-sent to [facility] in [place] with fax number [phone]    1346: TAMIE RN received notification pt declined by [facility]. No there  company services the [facility]. TAMIE RN to  meet with pt to discuss.     1412: TAMIE RN met with pt at bedside to discuss that no HH is accepting pt in home area of [place]. CM RN asked pt if they would like to reconsider SNF/Rehab. Pt continuing to decline SNF/Rehab. Pt asking for out of pocket cost for HH. Pt informed TAMIE RN would reach out to [facility] in [place] for prices.     TAMIE RN called [facility] ([facility]y) quoted out of pocket cost is $250 per visit for RN, $350 per visit for therapy.     1434: TAMIE RN met with pt at bedside and presented meal plans for [facility], pt stating he does need to go home as he has packages on his door step. Pt stating may have found a Caregiver to help once he is home. Pt [facility], pt stating will work with the Caregiver agency instead. TAMIE RN completed cab voucher and placed on pts chart for when pt discharges.      Escalations Completed: None    Medically Clear: No    Next Steps: TAMIE RN to follow up with Caregiver list and to inform pt of discussion with insurance.     Barriers to Discharge: Medical clearance and Transportation    Is the patient up for discharge tomorrow: No

## 2023-02-16 NOTE — ASSESSMENT & PLAN NOTE
Acute anemia post operatively, no evidence of bleeding   Check iron panel, reticulocyte count   Monitor Q8H for now, transfuse for Hgb <7

## 2023-02-16 NOTE — DISCHARGE PLANNING
Referral sent per choice to [facility]    Received Choice form at 0930  Agency/Facility Name: [facility] (FWW and shower chair)  Referral sent per Choice form at 0940    Per neville Kiran approved for shower chair    1118- [facility]

## 2023-02-16 NOTE — PROGRESS NOTES
Alert and able to let his needs known, waffle and mepliex's in place for prevention. Encourage to turn but is most comfortable on his back. Minimal pain 2/10 when not moving to his left knee.  C/o food selection; changed his diet yesterday to kosher per request and encouraged him to talk to dietary during the day to help with better meal choices  Monitor H/H and vitals and stool collection

## 2023-02-16 NOTE — DISCHARGE SUMMARY
Discharge Summary    CHIEF COMPLAINT ON ADMISSION  Chief Complaint   Patient presents with    Trauma Green     Pt arrives as a trauma green transfer from [facility]. Pt sustained a GLF today and fractured his left femur. Left leg currently in a knee immobilizer post reduction. GCS 15. -loc, - thinners        Reason for Admission  Trauma Green Transfer     Admission Date  2/13/2023    CODE STATUS  Full Code    HPI & HOSPITAL COURSE  Christian Chilel is a 62 y.o. male with a past medical history of hypothyroidism and insomnia who presented 2/13/2023 after sustaining a mechanical, ground-level fall. On admission found to have left femoral fracture. Laboratory evaluation was largely unremarkable.  Orthopedic surgery was consulted and patient was taken back to surgery for ORIF of left hip.  Patient tolerated procedure well without any postop surgical complications aside from mild anemia which stabilized without intervention.  He was evaluated by therapy and postacute rehab was recommended however patient did not want to go to rehab and wanted to discharge home.  We attempted to get home health however were unable to due to lack of coverage and his region of living.  Is highly recommended that patient go to postacute rehab however patient declined.  Therefore patient was discharged home without any services.  He was educated on signs symptoms that should prompt him seek medical attention.  Recommend he follow-up with orthopedic surgery in 2 weeks for suture removal and postop follow-up.    Therefore, he is discharged in fair and stable condition to home with close outpatient follow-up.    The patient met 2-midnight criteria for an inpatient stay at the time of discharge.    Discharge Date  2/16/2023    FOLLOW UP ITEMS POST DISCHARGE  Postop recovery  Patient was persistently tachycardic although asymptomatic, follow-up  Patient's TSH was low however he had normal T4 recommend repeat labs in 4 to 6 weeks as he may need adjustment  in his thyroid medication    DISCHARGE DIAGNOSES  Principal Problem:    Closed left subtrochanteric femur fracture, initial encounter ([facility]) POA: Yes  Active Problems:    Hypothyroidism POA: Yes    Primary insomnia POA: Yes    Thrombocytopenia ([facility]) POA: Unknown    Acute anemia POA: No  Resolved Problems:    * No resolved hospital problems. *      FOLLOW UP  Future Appointments   Date Time Provider Department Center   4/11/2023  2:20 PM Les Zamorano [facility] None     No follow-up provider specified.    MEDICATIONS ON DISCHARGE     Medication List        START taking these medications        Instructions   acetaminophen 500 MG Tabs  Commonly known as: TYLENOL   Take 1-2 Tablets by mouth every 6 hours as needed for Mild Pain or Moderate Pain.  Dose: 500-1,000 mg     oxyCODONE immediate-release 5 MG Tabs  Commonly known as: ROXICODONE   Take 1 Tablet by mouth every 6 hours as needed for Severe Pain for up to 5 days.  Dose: 5 mg            CONTINUE taking these medications        Instructions   [facility] Thyroid 240 MG tablet  Generic drug: thyroid   Take 1 Tablet by mouth every day.  Dose: 240 mg     docusate sodium 100 MG Caps  Commonly known as: COLACE   Take 300 mg by mouth at bedtime.  Dose: 300 mg     gabapentin 300 MG Caps  Commonly known as: NEURONTIN   Take 2 capsules at bedtime              Allergies  Allergies   Allergen Reactions    Food Anaphylaxis     Ottoniel       DIET  Orders Placed This Encounter   Procedures    Diet Order Diet: Regular (patient perfers turkey sandwhich or hamburger); Miscellaneous modifications: (optional): Kosher     Standing Status:   Standing     Number of Occurrences:   1     Order Specific Question:   Diet:     Answer:   Regular [1]     Comments:   patient perfers turkey sandwhich or hamburger     Order Specific Question:   Miscellaneous modifications: (optional)     Answer:   Varinder [14]       ACTIVITY  As tolerated.  Toe touch LEFT leg    CONSULTATIONS  Orthopedic  surgery  Physiatry    PROCEDURES  Left femur open reduction internal fixation with retrograde intramedullary nail on 2/14/2023    LABORATORY  Lab Results   Component Value Date    SODIUM 139 02/16/2023    POTASSIUM 4.0 02/16/2023    CHLORIDE 106 02/16/2023    CO2 24 02/16/2023    GLUCOSE 125 (H) 02/16/2023    BUN 15 02/16/2023    CREATININE 0.58 02/16/2023        Lab Results   Component Value Date    WBC 6.9 02/16/2023    HEMOGLOBIN 9.1 (L) 02/16/2023    HEMATOCRIT 25.7 (L) 02/16/2023    PLATELETCT 104 (L) 02/16/2023        Total time of the discharge process exceeds 36 minutes.

## (undated) DEVICE — SLEEVE, VASO, THIGH, MED

## (undated) DEVICE — DRAPE LARGE 3 QUARTER - (20/CA)

## (undated) DEVICE — TOWELS CLOTH SURGICAL - (4/PK 20PK/CA)

## (undated) DEVICE — SPONGE GAUZESTER 4 X 4 4PLY - (128PK/CA)

## (undated) DEVICE — DRAPE SURGICAL U 77X120 - (10/CA)

## (undated) DEVICE — TUBING CLEARLINK DUO-VENT - C-FLO (48EA/CA)

## (undated) DEVICE — TOWEL STOP TIMEOUT SAFETY FLAG (40EA/CA)

## (undated) DEVICE — SENSOR OXIMETER ADULT SPO2 RD SET (20EA/BX)

## (undated) DEVICE — DRESSING TRANSPARENT FILM TEGADERM 4 X 4.75" (50EA/BX)"

## (undated) DEVICE — SUTURE 2-0 VICRYL PLUS CT-1 - 8 X 18 INCH(12/BX)

## (undated) DEVICE — LOCKING DRILL 4.2X360MM

## (undated) DEVICE — ELECTRODE DUAL RETURN W/ CORD - (50/PK)

## (undated) DEVICE — FREEHAND DRILL 4.2X185MM

## (undated) DEVICE — PENCIL ELECTSURG 10FT BTN SWH - (50/CA)

## (undated) DEVICE — SET LEADWIRE 5 LEAD BEDSIDE DISPOSABLE ECG (1SET OF 5/EA)

## (undated) DEVICE — GLOVE BIOGEL INDICATOR SZ 8 SURGICAL PF LTX - (50/BX 4BX/CA)

## (undated) DEVICE — DRAPE SURG STERI-DRAPE 7X11OD - (40EA/CA)

## (undated) DEVICE — COVER LIGHT HANDLE ALC PLUS DISP (18EA/BX)

## (undated) DEVICE — Device

## (undated) DEVICE — LACTATED RINGERS INJ 1000 ML - (14EA/CA 60CA/PF)

## (undated) DEVICE — SUTURE GENERAL

## (undated) DEVICE — DRESSING XEROFORM 1X8 - (50/BX 4BX/CA)

## (undated) DEVICE — CLEANER ELECTRO-SURGICAL TIP - (25/BX 4BX/CA)

## (undated) DEVICE — SUCTION INSTRUMENT YANKAUER BULBOUS TIP W/O VENT (50EA/CA)

## (undated) DEVICE — GOWN WARMING STANDARD FLEX - (30/CA)

## (undated) DEVICE — K-WIRE 3MMX285MM STERILE

## (undated) DEVICE — BANDAGE ELASTIC 6 HONEYCOMB - 6X5YD LF (20/CA)"

## (undated) DEVICE — BAG SPONGE COUNT 10.25 X 32 - BLUE (250/CA)

## (undated) DEVICE — DRAPE 36X28IN RAD CARM BND BG - (25/CA) O

## (undated) DEVICE — SET EXTENSION WITH 2 PORTS (48EA/CA) ***PART #2C8610 IS A SUBSTITUTE*****

## (undated) DEVICE — BIT DRILL DIA3.5MM CANNULATED L ADD ON FITTING DISPOSABLE FOR 7MM HEADLESS COMPRESSION SCREW

## (undated) DEVICE — GUIDE WIRE BALL TIP 3X1000 STERILE

## (undated) DEVICE — STAPLER SKIN DISP - (6/BX 10BX/CA) VISISTAT

## (undated) DEVICE — BIT DRILL DIA2MM SCALED FOR VARIAX 2 WRIST FUSION LOCKING PLATE SYSTEM

## (undated) DEVICE — GLOVE SZ 7 BIOGEL PI MICRO - PF LF (50PR/BX 4BX/CA)

## (undated) DEVICE — GLOVE BIOGEL PI INDICATOR SZ 7.0 SURGICAL PF LF - (50/BX 4BX/CA)

## (undated) DEVICE — GLOVE BIOGEL PI INDICATOR SZ 6.5 SURGICAL PF LF - (50/BX 4BX/CA)

## (undated) DEVICE — PACK MAJOR ORTHO - (2EA/CA)

## (undated) DEVICE — GLOVE SZ 6.5 BIOGEL PI MICRO - PF LF (50PR/BX)

## (undated) DEVICE — GLOVE BIOGEL SZ 7.5 SURGICAL PF LTX - (50PR/BX 4BX/CA)

## (undated) DEVICE — SUCTION INSTRUMENT YANKAUER OPEN TIP W/O VENT (50EA/CA)

## (undated) DEVICE — REAMER SHAFT  MODIFIED TRINKLE  8X510MM

## (undated) DEVICE — GOWN SURGEONS X-LARGE - DISP. (30/CA)

## (undated) DEVICE — CANISTER SUCTION 3000ML MECHANICAL FILTER AUTO SHUTOFF MEDI-VAC NONSTERILE LF DISP  (40EA/CA)